# Patient Record
Sex: FEMALE | Race: WHITE | NOT HISPANIC OR LATINO | Employment: OTHER | URBAN - METROPOLITAN AREA
[De-identification: names, ages, dates, MRNs, and addresses within clinical notes are randomized per-mention and may not be internally consistent; named-entity substitution may affect disease eponyms.]

---

## 2017-09-07 ENCOUNTER — APPOINTMENT (OUTPATIENT)
Dept: WOMENS IMAGING | Facility: HOSPITAL | Age: 57
End: 2017-09-07

## 2017-09-07 PROCEDURE — 77067 SCR MAMMO BI INCL CAD: CPT | Performed by: RADIOLOGY

## 2017-09-07 PROCEDURE — 77063 BREAST TOMOSYNTHESIS BI: CPT | Performed by: RADIOLOGY

## 2017-09-18 ENCOUNTER — TREATMENT (OUTPATIENT)
Dept: PHYSICAL THERAPY | Facility: CLINIC | Age: 57
End: 2017-09-18

## 2017-09-18 DIAGNOSIS — M53.3 SACROILIAC JOINT DYSFUNCTION: ICD-10-CM

## 2017-09-18 DIAGNOSIS — S39.012D LUMBAR STRAIN, SUBSEQUENT ENCOUNTER: Primary | ICD-10-CM

## 2017-09-18 DIAGNOSIS — S86.112D GASTROCNEMIUS STRAIN, LEFT, SUBSEQUENT ENCOUNTER: ICD-10-CM

## 2017-09-18 DIAGNOSIS — S76.312D STRAIN OF HAMSTRING MUSCLE, LEFT, SUBSEQUENT ENCOUNTER: ICD-10-CM

## 2017-09-18 PROCEDURE — 97140 MANUAL THERAPY 1/> REGIONS: CPT | Performed by: PHYSICAL THERAPIST

## 2017-09-18 PROCEDURE — 97530 THERAPEUTIC ACTIVITIES: CPT | Performed by: PHYSICAL THERAPIST

## 2017-09-18 PROCEDURE — 97161 PT EVAL LOW COMPLEX 20 MIN: CPT | Performed by: PHYSICAL THERAPIST

## 2017-09-18 PROCEDURE — 97110 THERAPEUTIC EXERCISES: CPT | Performed by: PHYSICAL THERAPIST

## 2017-09-18 NOTE — PROGRESS NOTES
Orthopedic / Sports / Industrial Physical Therapy  Physical Therapy Initial Evaluation and Plan of Care    Patient Name: Azalia Castle          :  1960  Referring Physician: MUNIRA Spencer  Diagnosis: Lumbar strain, subsequent encounter [S39.012D]  ; SI Jt Dysfunction; Gastroc / HS Strain  Date of Evaluation: 2017  ______________________________________________________________________  Subjective Evaluation    History of Present Illness  Onset date: Couple of months ago.  Mechanism of injury: Exercising and noted pain later -     Pain  Current pain ratin  At worst pain ratin  Location: (L) LBP and pain posterior thigh, knee and calf - Denies NT-ing;   Alleviating factors: Heat; Stop activity; Alleve; Stretching knees out;   Exacerbated by: Bending knee;  Walking, rising, floor to stand, out of bed; (R) side-lying.  Symptom course: Somewhat better -     Diagnostic Tests  No diagnostic tests performed    Patient Goals  Patient/family treatment goals: Pain alleviation; normal mobility, strength, function and return to working out normally -          ___________________________________________________  Objective     Postural Observations  Standing posture: (L) Ilium / ASIS / PSIS higher vs (R); Hyperlordodic posture;         Palpation     Additional Palpation Details  Tender (L) LSS / SI / Sacral region - Tender at sacral base -   Tender posterior knee into proximal gastroc and distal hamstring -                 Active Range of Motion     Additional Active Range of Motion Details  Limited SB to (L) and limited lumbar flexion with pain (L) LSS / SI region upon return;   Knee / Hip WNL     Strength/Myotome Testing     Additional Strength Details  LE Myotomes grossly WNL -     Tests     Additional Tests Details  (-) SLR  (+) SI Jt Dysfunction (L) / upshear  (+) Flexed Sacrum   (-) Knee tests     See Treatment Flow sheet for Exercises, Manual therapy, and modalities.   FUNCTIONAL ACTIVITIES:  X 10 min  · TAPING / BRACING: NA  · Jt protection, ADL modification; Posture and     ___________________________________________________  Assessment & Plan     Assessment  Assessment details:   Lumbar Strain; SI Jt Dysfunciton; Hamstring / Gastroc strain LLE -     PROBLEMS: Pain; Limited mobility, strength, function, and unable to do regular job w/o limitations -   PROGNOSIS: Good    GOALS:   SHORT TERM GOALS: 2 weeks:  1) HEP Initiated; 2) Pain decreased 50%:   3) AROM  increased:  4) Improved functional ability grossly;     LONG TERM GOALS: 4 weeks (or at time of DISCHARGE): 1) (I) HEP; 2) AROM WFL and pain free; 3) Strength / mobility to be able to perform all ADL's and job-related activities w/o restrictions;       Plan  Planned therapy interventions: abdominal trunk stabilization, body mechanics training, flexibility, home exercise program, joint mobilization, therapeutic activities, stretching, strengthening, spinal/joint mobilization, soft tissue mobilization, postural training, neuromuscular re-education and manual therapy (Modalities prn; taping prn; )  Frequency: 2-3.  Duration in weeks: 4  Treatment plan discussed with: patient      ___________________________________________________  Manual Therapy:    25     mins  22026;   Therapeutic Exercise:    15     mins  63077;     Neuromuscular Chago:    NA    mins  38542;   Therapeutic Activity:     10     mins  29800;     Ultrasound:     10     mins  90906;    Electrical Stimulation:   20     mins  03861 ( );  Dry Needling     NA     mins self-pay   Gait Training:     NA     mins  85193;  EVAL TIME:   25 mins    Timed Treatment:   60   mins                Total Treatment:     110   mins    PT SIGNATURE:   Saurav Rothman, PT  DATE TREATMENT INITIATED: 9/18/2017  ___________________________________________________  Initial Certification  Certification Period: 12/17/2017  I certify that the therapy services are furnished while this patient is under  my care.  The services outlined above are required by this patient, and will be reviewed every 90 days.     PHYSICIAN: ________________________________  DATE: ______  MUNIRA Spencer        Please sign and return via fax to [phone].. Thank you, [facility].  ______________________________________________________________________  [address] [place] [address]  Phone: [phone] Fax: [phone]

## 2017-09-21 ENCOUNTER — TREATMENT (OUTPATIENT)
Dept: PHYSICAL THERAPY | Facility: CLINIC | Age: 57
End: 2017-09-21

## 2017-09-21 DIAGNOSIS — S76.312D STRAIN OF HAMSTRING MUSCLE, LEFT, SUBSEQUENT ENCOUNTER: ICD-10-CM

## 2017-09-21 DIAGNOSIS — S86.112D GASTROCNEMIUS STRAIN, LEFT, SUBSEQUENT ENCOUNTER: ICD-10-CM

## 2017-09-21 DIAGNOSIS — S39.012D LUMBAR STRAIN, SUBSEQUENT ENCOUNTER: Primary | ICD-10-CM

## 2017-09-21 DIAGNOSIS — M53.3 SACROILIAC JOINT DYSFUNCTION: ICD-10-CM

## 2017-09-21 PROCEDURE — 97014 ELECTRIC STIMULATION THERAPY: CPT | Performed by: PHYSICAL THERAPIST

## 2017-09-21 PROCEDURE — 97140 MANUAL THERAPY 1/> REGIONS: CPT | Performed by: PHYSICAL THERAPIST

## 2017-09-21 PROCEDURE — 97110 THERAPEUTIC EXERCISES: CPT | Performed by: PHYSICAL THERAPIST

## 2017-09-21 NOTE — PROGRESS NOTES
Physical Therapy Daily Progress Note    Patient Name: Azalia Castle         :  1960  Referring Physician: MUNIRA Spencer    Subjective   Azalia Castle reports: overall decreased pain and improved mobility and function - Notes pain in posterior knee after sitting for a while and minimal / no LBP since last session until today -   Notes pain in (L) LSS / SI region and posterior knee (L)     Objective   (L) Ilium / ASIS / PSIS higher vs (R);   Tender (L) LSS / SI region; Tender posterior (L) knee into proximal gastroc and distal HS (L)   (+) SI Jt dysfunction /  Upshear (L)-     See Exercise, Manual, and Modality Logs for complete treatment.     Functional / Therapeutic Activities:   min  · TAPING / BRACING: NA  · Jt protection, ADL modification; Posture and      Assessment/Plan  Lumbar strain / SI Jt Dysfunction; Gastroc / HS strain RLE   Improving with decreased pain and improved mobility and function -     Progress strengthening /stabilization /functional activity       _________________________________________________  Manual Therapy:    25     mins  04015;  Therapeutic Exercise:    20     mins  38258;     Neuromuscular Chago:    NA    mins  94532;    Therapeutic Activity:     NA     mins  94487;     Gait Training:      NA     mins  34333;     Ultrasound:     10     mins  41645;    Electrical Stimulation:    20     mins  15978 ( );  Dry Needling     NA     mins self-pay    Timed Treatment:   55   mins                  Total Treatment:     80   mins    Saurav Rothman PT  Physical Therapist

## 2017-09-25 ENCOUNTER — TREATMENT (OUTPATIENT)
Dept: PHYSICAL THERAPY | Facility: CLINIC | Age: 57
End: 2017-09-25

## 2017-09-25 DIAGNOSIS — S39.012D LUMBAR STRAIN, SUBSEQUENT ENCOUNTER: Primary | ICD-10-CM

## 2017-09-25 DIAGNOSIS — M53.3 SACROILIAC JOINT DYSFUNCTION: ICD-10-CM

## 2017-09-25 DIAGNOSIS — S76.312D STRAIN OF HAMSTRING MUSCLE, LEFT, SUBSEQUENT ENCOUNTER: ICD-10-CM

## 2017-09-25 DIAGNOSIS — S86.112D GASTROCNEMIUS STRAIN, LEFT, SUBSEQUENT ENCOUNTER: ICD-10-CM

## 2017-09-25 PROCEDURE — 97530 THERAPEUTIC ACTIVITIES: CPT | Performed by: PHYSICAL THERAPIST

## 2017-09-25 PROCEDURE — 97112 NEUROMUSCULAR REEDUCATION: CPT | Performed by: PHYSICAL THERAPIST

## 2017-09-25 PROCEDURE — 97110 THERAPEUTIC EXERCISES: CPT | Performed by: PHYSICAL THERAPIST

## 2017-09-28 ENCOUNTER — TREATMENT (OUTPATIENT)
Dept: PHYSICAL THERAPY | Facility: CLINIC | Age: 57
End: 2017-09-28

## 2017-09-28 DIAGNOSIS — M53.3 SACROILIAC JOINT DYSFUNCTION: ICD-10-CM

## 2017-09-28 DIAGNOSIS — S39.012D LUMBAR STRAIN, SUBSEQUENT ENCOUNTER: Primary | ICD-10-CM

## 2017-09-28 DIAGNOSIS — S76.312D STRAIN OF HAMSTRING MUSCLE, LEFT, SUBSEQUENT ENCOUNTER: ICD-10-CM

## 2017-09-28 DIAGNOSIS — S86.112D GASTROCNEMIUS STRAIN, LEFT, SUBSEQUENT ENCOUNTER: ICD-10-CM

## 2017-09-28 PROCEDURE — 97112 NEUROMUSCULAR REEDUCATION: CPT | Performed by: PHYSICAL THERAPIST

## 2017-09-28 PROCEDURE — 97110 THERAPEUTIC EXERCISES: CPT | Performed by: PHYSICAL THERAPIST

## 2017-09-28 PROCEDURE — 97530 THERAPEUTIC ACTIVITIES: CPT | Performed by: PHYSICAL THERAPIST

## 2017-10-01 NOTE — PROGRESS NOTES
Physical Therapy Daily Progress Note     Patient Name: Azalia Castle         :  1960  Referring Physician: MUNIRA Spencer     Subjective   Azalia Castle reports: decreased pain and improved mobility and function - Taping of (R) knee very helpful in alleviating anterior knee pain - Minimal LBP;  Less pain in posterior knee / distal thigh /proximal calf - Less painful getting up off of floor when doing exercises / boot camp - Feels like (L) hip is weak -  Bruising posterior knee from DTM last session, but pain much less -      Objective   Pelvis level -    Much less tender posterior (L) knee into proximal gastroc and distal HS (L)   Weak GM / Hip (L) with (+) Trendelenburg w/ single leg stance (L) -      See Exercise, Manual, and Modality Logs for complete treatment.      Functional / Therapeutic Activities: 25  min  · TAPING / BRACING: K-Tape to 1) Unload PF Jt (R); 2) Unload lateral HS (L);   · SEE EXERCISE FLOW SHEET -   · Jt protection, ADL modification; Posture and       Assessment/Plan  Lumbar strain / SI Jt Dysfunction; Gastroc / HS strain RLE   Improving with decreased pain and improved mobility and function -      Progress strengthening /stabilization /functional activity        _________________________________________________  Manual Therapy:                      NA     mins  74826;  Therapeutic Exercise:              40     mins  81810;     Neuromuscular Chago:             10    mins  22019;    Therapeutic Activity:                15     mins  41378;     Gait Training:                           NA     mins  74696;     Ultrasound:                              NA     mins  29452;    Electrical Stimulation:              NA     mins  42604 ( );  Dry Needling                            NA     mins self-pay     Timed Treatment:   65   mins                  Total Treatment:     75   mins     Saurav Rothman PT  Physical Therapist

## 2017-10-02 NOTE — PROGRESS NOTES
Physical Therapy Daily Progress Note      Patient Name: Azalia Castle         :  1960  Referring Physician: MUNIRA Spencer      Subjective   Azalia Castle reports: decreased pain and improved mobility and function - Taping of (R) knee very helpful in alleviating anterior knee pain - Minimal LBP;  Much Less pain in posterior knee / distal thigh /proximal calf - Know able to flex hip/knee, etc w/o pain - Much less painful getting up off of floor when doing exercises / boot camp -   Only notes some tightness in posterior knee / HS/calf -      Objective   Pelvis level -    Improved AROM all planes lumbar spine and LE -       See Exercise, Manual, and Modality Logs for complete treatment.      Functional / Therapeutic Activities: 25  min  · TAPING / BRACING: K-Tape to 1) Unload PF Jt (R);    · SEE EXERCISE FLOW SHEET -   · Jt protection, ADL modification; Posture and        Assessment/Plan  Lumbar strain / SI Jt Dysfunction; Gastroc / HS strain RLE   Improving with decreased pain and improved mobility and function -       Progress strengthening /stabilization /functional activity     _________________________________________________  Manual Therapy:                      NA     mins  45923;  Therapeutic Exercise:              45     mins  77176;     Neuromuscular Chago:             10    mins  47915;    Therapeutic Activity:                25     mins  20289;     Gait Training:                           NA     mins  37924;     Ultrasound:                              NA     mins  16010;    Electrical Stimulation:              NA     mins  81113 ( );  Dry Needling                            NA     mins self-pay      Timed Treatment:   80   mins                  Total Treatment:     85   mins      Saurav Rothman PT  Physical Therapist

## 2017-11-08 ENCOUNTER — TREATMENT (OUTPATIENT)
Dept: PHYSICAL THERAPY | Facility: CLINIC | Age: 57
End: 2017-11-08

## 2017-11-08 DIAGNOSIS — S86.911D KNEE STRAIN, RIGHT, SUBSEQUENT ENCOUNTER: ICD-10-CM

## 2017-11-08 DIAGNOSIS — S76.312D STRAIN OF HAMSTRING MUSCLE, LEFT, SUBSEQUENT ENCOUNTER: ICD-10-CM

## 2017-11-08 DIAGNOSIS — S86.112D GASTROCNEMIUS STRAIN, LEFT, SUBSEQUENT ENCOUNTER: Primary | ICD-10-CM

## 2017-11-08 PROCEDURE — 97164 PT RE-EVAL EST PLAN CARE: CPT | Performed by: PHYSICAL THERAPIST

## 2017-11-08 PROCEDURE — 97530 THERAPEUTIC ACTIVITIES: CPT | Performed by: PHYSICAL THERAPIST

## 2017-11-08 PROCEDURE — 97110 THERAPEUTIC EXERCISES: CPT | Performed by: PHYSICAL THERAPIST

## 2017-11-08 NOTE — PROGRESS NOTES
RE-EVAL / Physical Therapy Daily Progress Note      Patient Name: Azalia Castle         :  1960  Referring Physician: MUNIRA Spencer      Subjective   Azalia Castle reports: Taping of (L) knee very helpful in alleviating anterior knee pain - Minimal LBP;  Much Less pain in posterior knee / distal thigh /proximal calf - now only gets tight occasionally and is handled with taping -    Only notes some tightness in posterior knee / HS/calf -   Pain now mostly ant/inf/medial and medial (L) (onset after stepping up on very high step with  and fell off 10/26/17 and then at a party she bent over in high heels and her (L) knee HE 10/28/17 -  Standing, walking, squatting, stairs / LLE activities -  No problems with LB for some time -   Also notes pain (R) ant/medial proximal tibial region increased with RLE activities (driving, walking, steps, squatting, contact) -      Objective   Pelvis level -    Improved AROM all planes lumbar spine and LE -   (B) Calcaneal valgusGait WNL  (+) Step Up Test (L)  (+) Thessaly and Simba's medial jt line (L) knee -   Very tender medial joint line and pes region (L)  knee; mild tenderness medial PF jt (L) knee -   Compensated forefoot varus (B) feet -   Strength :  Limited VMO / Quad firing (L) with pain medial knee and PF Jt (L); Knee ext 4/5 w/ pain; Hip abduction4-/5 (L)       See Exercise, Manual, and Modality Logs for complete treatment.      Functional / Therapeutic Activities: 30  min  · TAPING / BRACING: K-Tape to 1) Unload PF Jt (L); 2) Unload infrapatellar joint; 3) Unload medial joint (L) knee;   Taping (B) Feet to address excessive pronation that increases stress to medial knee(s) -   · SEE EXERCISE FLOW SHEET -   · Jt protection, ADL modification; Posture and        Assessment/Plan  Lumbar strain / SI Jt Dysfunction - resolved; Gastroc / HS strain RLE near full resolution  (L) Knee strain with possible medial meniscus involvement and PF  Jt dysfunction -   Taping very helpful in decreasing knee pain and allowing improved function   Pt may need further assessment to rule out possible medial meniscus tear if no improvement with conservative Physical Therapy -    Problems: Pain, limited strength, gait, function and unable to participate in hobby activities such as working out, etc.   GOALS:   SHORT TERM GOALS: 2 weeks:  1) HEP Initiated; 2) Pain decreased 50%:   3) AROM  increased:  4) Improved gait / functional ability grossly;   LONG TERM GOALS: 4 weeks (or at time of DISCHARGE): 1) (I) HEP; 2) AROM WFL and pain free; 3) Strength / gait / mobility to be able to perform all ADL's and job-related activities w/o restrictions;       Progress strengthening /stabilization /functional activity 2-3 x / week including modalities prn, taping / bracing prn; Strengthening, NMR, functional activities, and HEP       _________________________________________________  Manual Therapy:                      NA     mins  92341;  Therapeutic Exercise:              30     mins  15167;     Neuromuscular Chago:             NA    mins  12770;    Therapeutic Activity:               30     mins  36052;     Gait Training:                           NA     mins  34547;     Ultrasound:                              NA     mins  06187;    Electrical Stimulation:              NA     mins  23094 ( );  Dry Needling                            NA     mins self-pay    RE-EVAL: 20 minutes    Timed Treatment:   60   mins                  Total Treatment:     90   mins      Saurav Rothman, PT  Physical Therapist

## 2018-11-08 ENCOUNTER — APPOINTMENT (OUTPATIENT)
Dept: WOMENS IMAGING | Facility: HOSPITAL | Age: 58
End: 2018-11-08

## 2018-11-08 PROCEDURE — 77063 BREAST TOMOSYNTHESIS BI: CPT | Performed by: RADIOLOGY

## 2018-11-08 PROCEDURE — 77067 SCR MAMMO BI INCL CAD: CPT | Performed by: RADIOLOGY

## 2020-03-06 ENCOUNTER — APPOINTMENT (OUTPATIENT)
Dept: WOMENS IMAGING | Facility: HOSPITAL | Age: 60
End: 2020-03-06

## 2020-03-06 PROCEDURE — 77067 SCR MAMMO BI INCL CAD: CPT | Performed by: RADIOLOGY

## 2020-03-06 PROCEDURE — 77063 BREAST TOMOSYNTHESIS BI: CPT | Performed by: RADIOLOGY

## 2021-02-25 ENCOUNTER — OFFICE VISIT (OUTPATIENT)
Dept: INTERNAL MEDICINE | Facility: CLINIC | Age: 61
End: 2021-02-25

## 2021-02-25 VITALS
TEMPERATURE: 97.1 F | HEIGHT: 60 IN | BODY MASS INDEX: 27.68 KG/M2 | DIASTOLIC BLOOD PRESSURE: 80 MMHG | HEART RATE: 81 BPM | RESPIRATION RATE: 18 BRPM | WEIGHT: 141 LBS | OXYGEN SATURATION: 98 % | SYSTOLIC BLOOD PRESSURE: 126 MMHG

## 2021-02-25 DIAGNOSIS — Z11.59 ENCOUNTER FOR HCV SCREENING TEST FOR LOW RISK PATIENT: ICD-10-CM

## 2021-02-25 DIAGNOSIS — F41.1 GAD (GENERALIZED ANXIETY DISORDER): ICD-10-CM

## 2021-02-25 DIAGNOSIS — Z00.00 WELL ADULT EXAM: Primary | ICD-10-CM

## 2021-02-25 DIAGNOSIS — Z11.4 SCREENING FOR HIV (HUMAN IMMUNODEFICIENCY VIRUS): ICD-10-CM

## 2021-02-25 DIAGNOSIS — F33.9 RECURRENT MAJOR DEPRESSIVE DISORDER, REMISSION STATUS UNSPECIFIED (HCC): ICD-10-CM

## 2021-02-25 PROCEDURE — 93000 ELECTROCARDIOGRAM COMPLETE: CPT | Performed by: INTERNAL MEDICINE

## 2021-02-25 PROCEDURE — 99396 PREV VISIT EST AGE 40-64: CPT | Performed by: INTERNAL MEDICINE

## 2021-02-25 RX ORDER — RALOXIFENE HYDROCHLORIDE 60 MG/1
60 TABLET, FILM COATED ORAL DAILY
COMMUNITY
Start: 2021-02-07

## 2021-02-25 RX ORDER — DESVENLAFAXINE 100 MG/1
100 TABLET, EXTENDED RELEASE ORAL DAILY
COMMUNITY
End: 2021-03-15 | Stop reason: SDUPTHER

## 2021-02-25 NOTE — PROGRESS NOTES
"Chief Complaint   Patient presents with   • Annual Exam       Subjective   Azalia Castle is a 60 y.o. female.     Having upcoming surgery, re doing her breast augmentation in mid 3/2021; no issues with chest pain, sob, headaches, vision changes    Taking raloxifine for osteoporosis, doing well, no side effects; taking for last 8 months; following with GYN    HOME/MDD, taking desvenlafaxine, doing well without issues; mood is doing well       The following portions of the patient's history were reviewed and updated as appropriate: allergies, current medications, past family history, past medical history, past social history, past surgical history, and problem list.    Review of Systems      Objective   Body mass index is 27.54 kg/m².   Vitals:    02/25/21 0911   BP: 126/80   BP Location: Left arm   Patient Position: Sitting   Cuff Size: Adult   Pulse: 81   Resp: 18   Temp: 97.1 °F (36.2 °C)   SpO2: 98%   Weight: 64 kg (141 lb)   Height: 152.4 cm (60\")        Physical Exam  Vitals signs and nursing note reviewed.   Constitutional:       Appearance: Normal appearance.   HENT:      Head: Normocephalic and atraumatic.      Right Ear: Tympanic membrane, ear canal and external ear normal.      Left Ear: Tympanic membrane, ear canal and external ear normal.      Nose: Nose normal.      Mouth/Throat:      Mouth: Mucous membranes are moist.      Pharynx: Oropharynx is clear.   Eyes:      Extraocular Movements: Extraocular movements intact.      Conjunctiva/sclera: Conjunctivae normal.      Pupils: Pupils are equal, round, and reactive to light.   Neck:      Musculoskeletal: Normal range of motion and neck supple.   Cardiovascular:      Rate and Rhythm: Normal rate and regular rhythm.      Pulses: Normal pulses.      Heart sounds: Normal heart sounds.   Pulmonary:      Effort: Pulmonary effort is normal.      Breath sounds: Normal breath sounds. No wheezing, rhonchi or rales.   Abdominal:      General: Abdomen is flat. " There is no distension.      Palpations: Abdomen is soft.      Tenderness: There is no abdominal tenderness.   Musculoskeletal: Normal range of motion.      Right lower leg: No edema.      Left lower leg: No edema.   Skin:     General: Skin is warm and dry.      Capillary Refill: Capillary refill takes less than 2 seconds.      Findings: No rash.   Neurological:      General: No focal deficit present.      Mental Status: She is alert and oriented to person, place, and time. Mental status is at baseline.   Psychiatric:         Mood and Affect: Mood normal.         Behavior: Behavior normal.           Current Outpatient Medications:   •  Cholecalciferol 50 MCG (2000 UT) tablet, Take  by mouth., Disp: , Rfl:   •  desvenlafaxine (PRISTIQ) 100 MG 24 hr tablet, Take 100 mg by mouth Daily., Disp: , Rfl:   •  raloxifene (EVISTA) 60 MG tablet, Take 60 mg by mouth Daily., Disp: , Rfl:      No results found for: CBCDIF, CMP, LIPIDINTERP, HGBA1C, TSH, EIHX85YF, PSA, TESTOSTERONE     Health Maintenance   Topic Date Due   • COLONOSCOPY  1960   • ZOSTER VACCINE (1 of 2) 11/01/2010   • MAMMOGRAM  09/18/2017   • PAP SMEAR  09/18/2017   • INFLUENZA VACCINE  08/01/2020   • LIPID PANEL  02/25/2021   • TDAP/TD VACCINES (2 - Td) 03/16/2029        Immunization History   Administered Date(s) Administered   • Tdap 03/16/2019       ECG 12 Lead    Date/Time: 2/25/2021 11:20 AM  Performed by: Yoseph Barros MD  Authorized by: Yoseph Barros MD   Previous ECG: no previous ECG available  Rhythm: sinus rhythm  Rate: normal  Conduction: conduction normal  ST Segments: ST segments normal  T Waves: T waves normal  QRS axis: normal  Other: no other findings    Clinical impression: normal ECG              Assessment/Plan   Diagnoses and all orders for this visit:    1. Well adult exam (Primary)  -     CBC & Differential  -     Comprehensive Metabolic Panel  -     TSH  -     Lipid Panel With / Chol / HDL Ratio  -     Vitamin D 25  Hydroxy  -     Hemoglobin A1c  -     ECG 12 Lead    PAP - normal in 2019 she states, getting repeat in 3/2020, get records  Mammogram - normal in 2019 she states, getting repeat in 3/2020, get records  Colonoscopy - she believes 2012, has polyps, will get records for review; if polyps supsect may need at 5 years  Glaucoma - yearly  AAA - na  Lung cancer - na  DXA - osteoporisis, following with GYN, raloxifine  HIV - checkign  HCV - checking  DM - checking  HLD - checking  Smoking - no  Depression - yes, well managed with meds  Vaccines - tdap 2019, get records  Falls - no issues  Alcohol Screening - no issues    2. Encounter for HCV screening test for low risk patient  -     Hepatitis C antibody    3. Screening for HIV (human immunodeficiency virus)  -     HIV-1/O/2 Ag/Ab w Reflex    4. HOME (generalized anxiety disorder)  5. Recurrent major depressive disorder, remission status unspecified (CMS/HCC)  - conitnue desvenlafaxine, doing well, discussed risks and benefits, outside time, exercise, therapy  - rtc 6 months                 No follow-ups on file.     Yoseph Barros MD  [facility]  Internal Medicine and Pediatrics  Phone: [phone]  Fax: [phone]

## 2021-02-26 ENCOUNTER — TELEPHONE (OUTPATIENT)
Dept: INTERNAL MEDICINE | Facility: CLINIC | Age: 61
End: 2021-02-26

## 2021-02-26 LAB
25(OH)D3+25(OH)D2 SERPL-MCNC: 65.4 NG/ML (ref 30–100)
ALBUMIN SERPL-MCNC: 4.6 G/DL (ref 3.5–5.2)
ALBUMIN/GLOB SERPL: 2.4 G/DL
ALP SERPL-CCNC: 101 U/L (ref 39–117)
ALT SERPL-CCNC: 14 U/L (ref 1–33)
AST SERPL-CCNC: 14 U/L (ref 1–32)
BASOPHILS # BLD AUTO: 0.07 10*3/MM3 (ref 0–0.2)
BASOPHILS NFR BLD AUTO: 1.1 % (ref 0–1.5)
BILIRUB SERPL-MCNC: 0.3 MG/DL (ref 0–1.2)
BUN SERPL-MCNC: 18 MG/DL (ref 8–23)
BUN/CREAT SERPL: 26.5 (ref 7–25)
CALCIUM SERPL-MCNC: 10 MG/DL (ref 8.6–10.5)
CHLORIDE SERPL-SCNC: 105 MMOL/L (ref 98–107)
CHOLEST SERPL-MCNC: 238 MG/DL (ref 0–200)
CHOLEST/HDLC SERPL: 3.22 {RATIO}
CO2 SERPL-SCNC: 24.7 MMOL/L (ref 22–29)
CREAT SERPL-MCNC: 0.68 MG/DL (ref 0.57–1)
EOSINOPHIL # BLD AUTO: 0.08 10*3/MM3 (ref 0–0.4)
EOSINOPHIL NFR BLD AUTO: 1.2 % (ref 0.3–6.2)
ERYTHROCYTE [DISTWIDTH] IN BLOOD BY AUTOMATED COUNT: 12.2 % (ref 12.3–15.4)
GLOBULIN SER CALC-MCNC: 1.9 GM/DL
GLUCOSE SERPL-MCNC: 97 MG/DL (ref 65–99)
HBA1C MFR BLD: 5.4 % (ref 4.8–5.6)
HCT VFR BLD AUTO: 42.3 % (ref 34–46.6)
HCV AB S/CO SERPL IA: <0.1 S/CO RATIO (ref 0–0.9)
HDLC SERPL-MCNC: 74 MG/DL (ref 40–60)
HGB BLD-MCNC: 13.7 G/DL (ref 12–15.9)
HIV 1+2 AB+HIV1 P24 AG SERPL QL IA: NON REACTIVE
IMM GRANULOCYTES # BLD AUTO: 0.02 10*3/MM3 (ref 0–0.05)
IMM GRANULOCYTES NFR BLD AUTO: 0.3 % (ref 0–0.5)
LDLC SERPL CALC-MCNC: 153 MG/DL (ref 0–100)
LYMPHOCYTES # BLD AUTO: 1.92 10*3/MM3 (ref 0.7–3.1)
LYMPHOCYTES NFR BLD AUTO: 29.6 % (ref 19.6–45.3)
MCH RBC QN AUTO: 30.2 PG (ref 26.6–33)
MCHC RBC AUTO-ENTMCNC: 32.4 G/DL (ref 31.5–35.7)
MCV RBC AUTO: 93.2 FL (ref 79–97)
MONOCYTES # BLD AUTO: 0.54 10*3/MM3 (ref 0.1–0.9)
MONOCYTES NFR BLD AUTO: 8.3 % (ref 5–12)
NEUTROPHILS # BLD AUTO: 3.86 10*3/MM3 (ref 1.7–7)
NEUTROPHILS NFR BLD AUTO: 59.5 % (ref 42.7–76)
NRBC BLD AUTO-RTO: 0 /100 WBC (ref 0–0.2)
PLATELET # BLD AUTO: 256 10*3/MM3 (ref 140–450)
POTASSIUM SERPL-SCNC: 4.3 MMOL/L (ref 3.5–5.2)
PROT SERPL-MCNC: 6.5 G/DL (ref 6–8.5)
RBC # BLD AUTO: 4.54 10*6/MM3 (ref 3.77–5.28)
SODIUM SERPL-SCNC: 139 MMOL/L (ref 136–145)
TRIGL SERPL-MCNC: 66 MG/DL (ref 0–150)
TSH SERPL DL<=0.005 MIU/L-ACNC: 3.71 UIU/ML (ref 0.27–4.2)
VLDLC SERPL CALC-MCNC: 11 MG/DL (ref 5–40)
WBC # BLD AUTO: 6.49 10*3/MM3 (ref 3.4–10.8)

## 2021-02-26 NOTE — TELEPHONE ENCOUNTER
PATIENT NEEDS FOR DR WARD TO WRITE SOMETHING STATING THAT SHE IS CLEARED TO TO HAVE SURGERY, SURGERY DATE IS 03/16. SHE WILL NEED TO HAVE THIS BY 03/03. SHE NEEDS  TO TAKE THIS TO THE SURGEON WHEN SHE GOES FOR HER PRE OP    PLEASE ADVISE WHEN SHE CAN PICK THIS UP    [phone]

## 2021-03-03 RX ORDER — DESVENLAFAXINE 100 MG/1
TABLET, EXTENDED RELEASE ORAL
Qty: 90 TABLET | OUTPATIENT
Start: 2021-03-03

## 2021-03-08 ENCOUNTER — APPOINTMENT (OUTPATIENT)
Dept: WOMENS IMAGING | Facility: HOSPITAL | Age: 61
End: 2021-03-08

## 2021-03-08 PROCEDURE — 77063 BREAST TOMOSYNTHESIS BI: CPT | Performed by: RADIOLOGY

## 2021-03-08 PROCEDURE — 77067 SCR MAMMO BI INCL CAD: CPT | Performed by: RADIOLOGY

## 2021-03-15 RX ORDER — DESVENLAFAXINE 100 MG/1
100 TABLET, EXTENDED RELEASE ORAL DAILY
Qty: 90 TABLET | Refills: 2 | Status: SHIPPED | OUTPATIENT
Start: 2021-03-15 | End: 2021-12-12

## 2021-03-15 NOTE — TELEPHONE ENCOUNTER
Caller: Azalia Castle    Relationship: Self    Best call back number: [phone] (M)  Medication needed:   Requested Prescriptions     Pending Prescriptions Disp Refills   • desvenlafaxine (PRISTIQ) 100 MG 24 hr tablet       Sig: Take 1 tablet by mouth Daily.       When do you need the refill by: 03/15/21    What details did the patient provide when requesting the medication: PATIENT HAS 5 LEFT    Does the patient have less than a 3 day supply:  [] Yes  [x] No    What is the patient's preferred pharmacy: [id number] [address] [phone] [facility] [phone]

## 2021-03-17 ENCOUNTER — APPOINTMENT (OUTPATIENT)
Dept: WOMENS IMAGING | Facility: HOSPITAL | Age: 61
End: 2021-03-17

## 2021-03-17 PROCEDURE — 77061 BREAST TOMOSYNTHESIS UNI: CPT | Performed by: RADIOLOGY

## 2021-03-17 PROCEDURE — 77065 DX MAMMO INCL CAD UNI: CPT | Performed by: RADIOLOGY

## 2021-03-17 PROCEDURE — G0279 TOMOSYNTHESIS, MAMMO: HCPCS | Performed by: RADIOLOGY

## 2021-03-17 PROCEDURE — 76641 ULTRASOUND BREAST COMPLETE: CPT | Performed by: RADIOLOGY

## 2021-09-29 ENCOUNTER — TELEPHONE (OUTPATIENT)
Dept: INTERNAL MEDICINE | Facility: CLINIC | Age: 61
End: 2021-09-29

## 2021-09-29 NOTE — TELEPHONE ENCOUNTER
Caller: Azalia Castle    Relationship: Self    Best call back number: [phone]    What form or medical record are you requesting: LABS FORM 02/25    Who is requesting this form or medical record from you: ANOTHER DR     How would you like to receive the form or medical records (pick-up, mail, fax): PCIKUP    If pick-up, provide patient with address and location details    Timeframe paperwork needed: ASAP    Additional notes: PATIENT IS WANTING TO KNOW IF SHE CAN  LABS FROM 02/25. SHE NEEDS TO  TODAY. PLEASE CALLBACK

## 2021-12-12 RX ORDER — DESVENLAFAXINE 100 MG/1
TABLET, EXTENDED RELEASE ORAL
Qty: 30 TABLET | Refills: 0 | Status: SHIPPED | OUTPATIENT
Start: 2021-12-12 | End: 2022-01-21 | Stop reason: SDUPTHER

## 2022-02-08 ENCOUNTER — TELEPHONE (OUTPATIENT)
Dept: INTERNAL MEDICINE | Facility: CLINIC | Age: 62
End: 2022-02-08

## 2022-02-08 NOTE — TELEPHONE ENCOUNTER
Caller: Azalia Castle    Relationship: Self    Best call back number: [phone]    What medication are you requesting: SOMETHING FOR LINGERING COUGH     What are your current symptoms: COUGH AND CONGESTION    How long have you been experiencing symptoms: 9 DAYS    Have you had these symptoms before:    [] Yes  [x] No    Have you been treated for these symptoms before:   [] Yes  [x] No    If a prescription is needed, what is your preferred pharmacy and phone number:  [id number] - [facility] - [address]  AT [place] [address] & [facility] [facility] - [phone]  - [phone] [employer]  [phone]    Additional notes: PATIENT TESTED POSITIVE FOR COVID ON 01/30/22. SHE IS HAVING LINGERING COUGH AND CONGESTION. SHE IS COUGHING UP GUNK.     PATIENT IS ASKING FOR SOMETHING TO HELP WITH THE COUGH. SHE IS NOT SLEEPING. SHE STATES THAT WHEN SHE LAYS DOWN THE COUGHING GETS WORSE.     PLEASE CALL PATIENT TO ADVISE IF SHE NEEDS TO BE SEEN IN OFFICE/MYCHART OR IF OFFICE WAS ABLE TO CALL IN A PRESCRIPTION.

## 2022-02-09 NOTE — TELEPHONE ENCOUNTER
Needs appt, can go to urgent care if needs appt today, otherwise we may have appts later this week

## 2022-02-14 RX ORDER — DESVENLAFAXINE 100 MG/1
100 TABLET, EXTENDED RELEASE ORAL DAILY
Qty: 30 TABLET | Refills: 0 | Status: SHIPPED | OUTPATIENT
Start: 2022-02-14 | End: 2022-03-14

## 2022-02-16 ENCOUNTER — OFFICE VISIT (OUTPATIENT)
Dept: INTERNAL MEDICINE | Facility: CLINIC | Age: 62
End: 2022-02-16

## 2022-02-16 VITALS
RESPIRATION RATE: 18 BRPM | HEART RATE: 74 BPM | OXYGEN SATURATION: 98 % | HEIGHT: 60 IN | BODY MASS INDEX: 27.61 KG/M2 | WEIGHT: 140.6 LBS | TEMPERATURE: 98.4 F | SYSTOLIC BLOOD PRESSURE: 128 MMHG | DIASTOLIC BLOOD PRESSURE: 72 MMHG

## 2022-02-16 DIAGNOSIS — J18.9 COMMUNITY ACQUIRED PNEUMONIA, UNSPECIFIED LATERALITY: Primary | ICD-10-CM

## 2022-02-16 DIAGNOSIS — H61.21 IMPACTED CERUMEN OF RIGHT EAR: ICD-10-CM

## 2022-02-16 PROCEDURE — 99213 OFFICE O/P EST LOW 20 MIN: CPT | Performed by: INTERNAL MEDICINE

## 2022-02-16 PROCEDURE — 69209 REMOVE IMPACTED EAR WAX UNI: CPT | Performed by: INTERNAL MEDICINE

## 2022-02-16 RX ORDER — ASPIRIN 81 MG/1
81 TABLET ORAL DAILY
COMMUNITY

## 2022-02-16 RX ORDER — BENZONATATE 200 MG/1
CAPSULE ORAL
COMMUNITY
Start: 2022-02-04 | End: 2022-10-18

## 2022-02-16 RX ORDER — AZELASTINE HYDROCHLORIDE 0.5 MG/ML
SOLUTION/ DROPS OPHTHALMIC
COMMUNITY
Start: 2021-12-22

## 2022-02-16 RX ORDER — LEVOFLOXACIN 750 MG/1
750 TABLET ORAL DAILY
Qty: 5 TABLET | Refills: 0 | Status: SHIPPED | OUTPATIENT
Start: 2022-02-16 | End: 2022-02-21

## 2022-02-16 NOTE — PROGRESS NOTES
"Chief Complaint  covid (x 3 weeks ago ), Bronchitis (x 3 weeks ), and Cough (productive )    Subjective          Azalia Castle presents to [facility]  Here with 3 weeks of cough, congestion; has been seen by urgent care, allergy; has completed azithro, prednisone burst, albuterol; feels she is now coughing up yelow phlegm; is having some congestion and head pressure, ear pressure    Bronchitis  Associated symptoms include coughing.   Cough  Her past medical history is significant for bronchitis.       Objective   Vital Signs:   /72   Pulse 74   Temp 98.4 °F (36.9 °C)   Resp 18   Ht 152.4 cm (60\")   Wt 63.8 kg (140 lb 9.6 oz)   SpO2 98%   BMI 27.46 kg/m²     Physical Exam   Result Review :          Ear Cerumen Removal    Date/Time: 2/16/2022 11:17 AM  Performed by: Yoseph Barros MD  Authorized by: Yoseph Barros MD     Anesthesia:  Local Anesthetic: none  Location details: right ear  Patient tolerance: patient tolerated the procedure well with no immediate complications  Procedure type: irrigation   Sedation:  Patient sedated: no              Assessment and Plan    Diagnoses and all orders for this visit:    1. Community acquired pneumonia, unspecified laterality (Primary)  -     levoFLOXacin (Levaquin) 750 MG tablet; Take 1 tablet by mouth Daily for 5 days.  Dispense: 5 tablet; Refill: 0  -     XR Chest PA & Lateral; Future    2. Impacted cerumen of right ear  -     Cerumen Removal    - consider CAP vs. Sinusitis; will start levofloxacin, discussed risks and benefits; conitnue flonase and zyrtec  - rtc to follow up worsening, change in illness  - ears irrigated and cleaned today      Follow Up   No follow-ups on file.  Patient was given instructions and counseling regarding her condition or for health maintenance advice. Please see specific information pulled into the AVS if appropriate.       "

## 2022-03-14 RX ORDER — DESVENLAFAXINE 100 MG/1
TABLET, EXTENDED RELEASE ORAL
Qty: 30 TABLET | Refills: 0 | Status: SHIPPED | OUTPATIENT
Start: 2022-03-14 | End: 2022-04-13

## 2022-03-14 NOTE — TELEPHONE ENCOUNTER
Rx Refill Note  Requested Prescriptions     Pending Prescriptions Disp Refills   • desvenlafaxine (PRISTIQ) 100 MG 24 hr tablet [Pharmacy Med Name: DESVENLAFAXINE SUCCNT ER 100MG] 30 tablet 0     Sig: TAKE 1 TABLET BY MOUTH EVERY DAY      Last office visit with prescribing clinician: 2/16/2022      Next office visit with prescribing clinician: 4/13/2022            Andres Renee MA  03/14/22, 08:15 EDT

## 2022-04-13 ENCOUNTER — OFFICE VISIT (OUTPATIENT)
Dept: INTERNAL MEDICINE | Facility: CLINIC | Age: 62
End: 2022-04-13

## 2022-04-13 VITALS
HEART RATE: 64 BPM | DIASTOLIC BLOOD PRESSURE: 64 MMHG | WEIGHT: 140 LBS | TEMPERATURE: 98.2 F | OXYGEN SATURATION: 98 % | SYSTOLIC BLOOD PRESSURE: 118 MMHG | BODY MASS INDEX: 27.48 KG/M2 | RESPIRATION RATE: 18 BRPM | HEIGHT: 60 IN

## 2022-04-13 DIAGNOSIS — Z12.11 ENCOUNTER FOR SCREENING FOR MALIGNANT NEOPLASM OF COLON: ICD-10-CM

## 2022-04-13 DIAGNOSIS — Z00.00 WELL ADULT EXAM: Primary | ICD-10-CM

## 2022-04-13 DIAGNOSIS — E55.9 VITAMIN D DEFICIENCY: ICD-10-CM

## 2022-04-13 DIAGNOSIS — Z12.31 ENCOUNTER FOR SCREENING MAMMOGRAM FOR MALIGNANT NEOPLASM OF BREAST: ICD-10-CM

## 2022-04-13 PROCEDURE — 93000 ELECTROCARDIOGRAM COMPLETE: CPT | Performed by: INTERNAL MEDICINE

## 2022-04-13 PROCEDURE — 99396 PREV VISIT EST AGE 40-64: CPT | Performed by: INTERNAL MEDICINE

## 2022-04-13 RX ORDER — BUPROPION HYDROCHLORIDE 150 MG/1
150 TABLET ORAL DAILY
Qty: 30 TABLET | Refills: 3 | Status: SHIPPED | OUTPATIENT
Start: 2022-04-13

## 2022-04-13 RX ORDER — DESVENLAFAXINE 100 MG/1
TABLET, EXTENDED RELEASE ORAL
Qty: 30 TABLET | Refills: 0 | Status: SHIPPED | OUTPATIENT
Start: 2022-04-13 | End: 2022-04-28 | Stop reason: SDUPTHER

## 2022-04-13 NOTE — PROGRESS NOTES
"Chief Complaint   Patient presents with   • Annual Exam       Subjective   Azalia Castle is a 61 y.o. female.     History of Present Illness     The following portions of the patient's history were reviewed and updated as appropriate: allergies, current medications, past family history, past medical history, past social history, past surgical history, and problem list.    Review of Systems      Objective   Body mass index is 27.34 kg/m².   Vitals:    04/13/22 1054   BP: 118/64   Pulse: 64   Resp: 18   Temp: 98.2 °F (36.8 °C)   SpO2: 98%   Weight: 63.5 kg (140 lb)   Height: 152.4 cm (60\")        Physical Exam  Vitals and nursing note reviewed.   Constitutional:       Appearance: Normal appearance.   HENT:      Head: Normocephalic and atraumatic.      Right Ear: Tympanic membrane, ear canal and external ear normal.      Left Ear: Tympanic membrane, ear canal and external ear normal.      Nose: Nose normal.      Mouth/Throat:      Mouth: Mucous membranes are moist.      Pharynx: Oropharynx is clear.   Eyes:      Extraocular Movements: Extraocular movements intact.      Conjunctiva/sclera: Conjunctivae normal.      Pupils: Pupils are equal, round, and reactive to light.   Cardiovascular:      Rate and Rhythm: Normal rate and regular rhythm.      Pulses: Normal pulses.      Heart sounds: Normal heart sounds.   Pulmonary:      Effort: Pulmonary effort is normal.      Breath sounds: Normal breath sounds. No wheezing, rhonchi or rales.   Abdominal:      General: Abdomen is flat. There is no distension.      Palpations: Abdomen is soft.      Tenderness: There is no abdominal tenderness.   Musculoskeletal:         General: Normal range of motion.      Cervical back: Normal range of motion and neck supple.      Right lower leg: No edema.      Left lower leg: No edema.   Skin:     General: Skin is warm and dry.      Capillary Refill: Capillary refill takes less than 2 seconds.      Findings: No rash.   Neurological:      " General: No focal deficit present.      Mental Status: She is alert and oriented to person, place, and time. Mental status is at baseline.   Psychiatric:         Mood and Affect: Mood normal.         Behavior: Behavior normal.           Current Outpatient Medications:   •  aspirin 81 MG EC tablet, Take 81 mg by mouth Daily., Disp: , Rfl:   •  azelastine (OPTIVAR) 0.05 % ophthalmic solution, INSTILL 1 DROP IN EACH EYE TWICE DAILY AS NEEDED, Disp: , Rfl:   •  benzonatate (TESSALON) 200 MG capsule, TAKE 1 CAPSULE BY MOUTH EVERY 12 HOURS AS NEEDED, Disp: , Rfl:   •  Cholecalciferol 50 MCG (2000 UT) tablet, Take  by mouth., Disp: , Rfl:   •  desvenlafaxine (PRISTIQ) 100 MG 24 hr tablet, TAKE 1 TABLET BY MOUTH EVERY DAY, Disp: 30 tablet, Rfl: 0  •  raloxifene (EVISTA) 60 MG tablet, Take 60 mg by mouth Daily., Disp: , Rfl:      Lab Results   Component Value Date    HGBA1C 5.40 02/25/2021    TSH 3.710 02/25/2021    [id number] 65.4 02/25/2021        Health Maintenance   Topic Date Due   • ZOSTER VACCINE (1 of 2) Never done   • MAMMOGRAM  Never done   • PAP SMEAR  Never done   • LIPID PANEL  02/25/2022   • INFLUENZA VACCINE  08/01/2022   • TDAP/TD VACCINES (2 - Td or Tdap) 03/16/2029        Immunization History   Administered Date(s) Administered   • COVID-19 (PFIZER) PURPLE CAP 03/27/2021, 04/17/2021   • Tdap 03/16/2019         Assessment/Plan   Diagnoses and all orders for this visit:    1. Well adult exam (Primary)    2. Vitamin D deficiency    3. Encounter for screening mammogram for malignant neoplasm of breast             Well adult exam  - labs checked and evaluated    PAP - with GYN, up to date  Mammogram - ordered today  Colonoscopy - last 10 years ago, due now, states history of polyps  Glaucoma - yearly  AAA - na  Lung cancer - na  DXA - at 65  HIV - checking  HCV - checking  DM - checking  HLD - checking  Smoking - no  Depression - no, [id number]  Vaccines - get records  Falls - no  Alcohol Screening - no    Discussed  mental health, sexual health, substance use, abuse, anticipatory guidance given.      No follow-ups on file.     Yoseph Barros MD  [facility] [place]  Internal Medicine and Pediatrics  Phone: [phone]  Fax: [phone]

## 2022-04-14 LAB
25(OH)D3+25(OH)D2 SERPL-MCNC: 61 NG/ML (ref 30–100)
ALBUMIN SERPL-MCNC: 4.3 G/DL (ref 3.8–4.8)
ALBUMIN/GLOB SERPL: 2.2 {RATIO} (ref 1.2–2.2)
ALP SERPL-CCNC: 84 IU/L (ref 44–121)
ALT SERPL-CCNC: 16 IU/L (ref 0–32)
AST SERPL-CCNC: 19 IU/L (ref 0–40)
BASOPHILS # BLD AUTO: 0.1 X10E3/UL (ref 0–0.2)
BASOPHILS NFR BLD AUTO: 1 %
BILIRUB SERPL-MCNC: 0.3 MG/DL (ref 0–1.2)
BUN SERPL-MCNC: 14 MG/DL (ref 8–27)
BUN/CREAT SERPL: 17 (ref 12–28)
CALCIUM SERPL-MCNC: 9.3 MG/DL (ref 8.7–10.3)
CHLORIDE SERPL-SCNC: 105 MMOL/L (ref 96–106)
CHOLEST SERPL-MCNC: 234 MG/DL (ref 100–199)
CHOLEST/HDLC SERPL: 3 RATIO (ref 0–4.4)
CO2 SERPL-SCNC: 20 MMOL/L (ref 20–29)
CREAT SERPL-MCNC: 0.81 MG/DL (ref 0.57–1)
EGFRCR SERPLBLD CKD-EPI 2021: 83 ML/MIN/1.73
EOSINOPHIL # BLD AUTO: 0.2 X10E3/UL (ref 0–0.4)
EOSINOPHIL NFR BLD AUTO: 3 %
ERYTHROCYTE [DISTWIDTH] IN BLOOD BY AUTOMATED COUNT: 12.6 % (ref 11.7–15.4)
GLOBULIN SER CALC-MCNC: 2 G/DL (ref 1.5–4.5)
GLUCOSE SERPL-MCNC: 93 MG/DL (ref 65–99)
HBA1C MFR BLD: 5.4 % (ref 4.8–5.6)
HCT VFR BLD AUTO: 40.3 % (ref 34–46.6)
HDLC SERPL-MCNC: 78 MG/DL
HGB BLD-MCNC: 13.3 G/DL (ref 11.1–15.9)
IMM GRANULOCYTES # BLD AUTO: 0 X10E3/UL (ref 0–0.1)
IMM GRANULOCYTES NFR BLD AUTO: 0 %
INSULIN SERPL-ACNC: 9.7 UIU/ML (ref 2.6–24.9)
LDLC SERPL CALC-MCNC: 141 MG/DL (ref 0–99)
LYMPHOCYTES # BLD AUTO: 1.9 X10E3/UL (ref 0.7–3.1)
LYMPHOCYTES NFR BLD AUTO: 33 %
MCH RBC QN AUTO: 31.7 PG (ref 26.6–33)
MCHC RBC AUTO-ENTMCNC: 33 G/DL (ref 31.5–35.7)
MCV RBC AUTO: 96 FL (ref 79–97)
MONOCYTES # BLD AUTO: 0.5 X10E3/UL (ref 0.1–0.9)
MONOCYTES NFR BLD AUTO: 9 %
NEUTROPHILS # BLD AUTO: 3.2 X10E3/UL (ref 1.4–7)
NEUTROPHILS NFR BLD AUTO: 54 %
PLATELET # BLD AUTO: 282 X10E3/UL (ref 150–450)
POTASSIUM SERPL-SCNC: 4.6 MMOL/L (ref 3.5–5.2)
PROT SERPL-MCNC: 6.3 G/DL (ref 6–8.5)
RBC # BLD AUTO: 4.2 X10E6/UL (ref 3.77–5.28)
SODIUM SERPL-SCNC: 142 MMOL/L (ref 134–144)
TRIGL SERPL-MCNC: 88 MG/DL (ref 0–149)
TSH SERPL DL<=0.005 MIU/L-ACNC: 2 UIU/ML (ref 0.45–4.5)
VLDLC SERPL CALC-MCNC: 15 MG/DL (ref 5–40)
WBC # BLD AUTO: 5.9 X10E3/UL (ref 3.4–10.8)

## 2022-04-27 ENCOUNTER — TELEPHONE (OUTPATIENT)
Dept: INTERNAL MEDICINE | Facility: CLINIC | Age: 62
End: 2022-04-27

## 2022-04-27 RX ORDER — DESVENLAFAXINE 100 MG/1
100 TABLET, EXTENDED RELEASE ORAL DAILY
Qty: 30 TABLET | Refills: 0 | Status: CANCELLED | OUTPATIENT
Start: 2022-04-27

## 2022-04-27 NOTE — TELEPHONE ENCOUNTER
Rx Refill Note  Requested Prescriptions     Pending Prescriptions Disp Refills   • desvenlafaxine (PRISTIQ) 100 MG 24 hr tablet 30 tablet 0     Sig: Take 1 tablet by mouth Daily.      Last office visit with prescribing clinician: 4/13/2022      Next office visit with prescribing clinician: Visit date not found            Andres Renee MA  04/27/22, 09:24 EDT

## 2022-04-27 NOTE — TELEPHONE ENCOUNTER
Caller: Azalia Castle    Relationship: Self    Best call back number:     Requested Prescriptions:   Requested Prescriptions     Pending Prescriptions Disp Refills   • desvenlafaxine (PRISTIQ) 100 MG 24 hr tablet 30 tablet 0     Sig: Take 1 tablet by mouth Daily.        Pharmacy where request should be sent: [facility]/PHARMACY #[id number] [place], [place] - [address] [facility] [phone] [facility] [phone]      Additional details provided by patient: FOR INSURANCE NEEDS 90 DAY SUPPLY    Does the patient have less than a 3 day supply:  [] Yes  [x] No    Lianet Moreland Rep   04/27/22 09:13 EDT

## 2022-04-28 RX ORDER — DESVENLAFAXINE 100 MG/1
100 TABLET, EXTENDED RELEASE ORAL DAILY
Qty: 30 TABLET | Refills: 3 | Status: SHIPPED | OUTPATIENT
Start: 2022-04-28 | End: 2022-07-25

## 2022-04-28 RX ORDER — DESVENLAFAXINE 100 MG/1
100 TABLET, EXTENDED RELEASE ORAL DAILY
Qty: 30 TABLET | Refills: 3 | Status: SHIPPED | OUTPATIENT
Start: 2022-04-28 | End: 2022-04-28 | Stop reason: SDUPTHER

## 2022-04-28 NOTE — TELEPHONE ENCOUNTER
Rx Refill Note  Requested Prescriptions     Pending Prescriptions Disp Refills   • desvenlafaxine (PRISTIQ) 100 MG 24 hr tablet 30 tablet 3     Sig: Take 1 tablet by mouth Daily.      Last office visit with prescribing clinician: 4/13/2022      Next office visit with prescribing clinician: Visit date not found            Andres Renee MA  04/28/22, 09:39 EDT

## 2022-04-28 NOTE — TELEPHONE ENCOUNTER
Caller: Azalia Castle    Relationship: Self    Best call back number: [phone]    Requested Prescriptions:   Requested Prescriptions     Pending Prescriptions Disp Refills   • desvenlafaxine (PRISTIQ) 100 MG 24 hr tablet 30 tablet 3     Sig: Take 1 tablet by mouth Daily.        Pharmacy where request should be sent: [facility]/PHARMACY #[id number] - [place], [place] - [address] [place] [address] [phone] [facility] [phone] FX     Additional details provided by patient: ASKING FOR 90 DAY SUPPLY ALSO WAS SENT TO WRONG PHARMACY CAN IT BE RESENT TO [facility] T[facility]AR ROS[facility]     Does the patient have less than a 3 day supply:  [x] Yes  [] No    [facility], Lianet Rep   04/28/22 09:25 EDT

## 2022-05-04 ENCOUNTER — APPOINTMENT (OUTPATIENT)
Dept: WOMENS IMAGING | Facility: HOSPITAL | Age: 62
End: 2022-05-04

## 2022-05-04 PROCEDURE — 77067 SCR MAMMO BI INCL CAD: CPT | Performed by: RADIOLOGY

## 2022-05-04 PROCEDURE — 77063 BREAST TOMOSYNTHESIS BI: CPT | Performed by: RADIOLOGY

## 2022-07-25 RX ORDER — DESVENLAFAXINE 100 MG/1
TABLET, EXTENDED RELEASE ORAL
Qty: 90 TABLET | Refills: 1 | Status: SHIPPED | OUTPATIENT
Start: 2022-07-25

## 2022-09-27 ENCOUNTER — OFFICE VISIT (OUTPATIENT)
Dept: INTERNAL MEDICINE | Facility: CLINIC | Age: 62
End: 2022-09-27

## 2022-09-27 ENCOUNTER — TELEPHONE (OUTPATIENT)
Dept: INTERNAL MEDICINE | Facility: CLINIC | Age: 62
End: 2022-09-27

## 2022-09-27 VITALS
DIASTOLIC BLOOD PRESSURE: 88 MMHG | HEART RATE: 94 BPM | RESPIRATION RATE: 16 BRPM | OXYGEN SATURATION: 97 % | SYSTOLIC BLOOD PRESSURE: 130 MMHG | TEMPERATURE: 97 F | BODY MASS INDEX: 27.34 KG/M2 | HEIGHT: 60 IN

## 2022-09-27 DIAGNOSIS — J01.40 ACUTE NON-RECURRENT PANSINUSITIS: Primary | ICD-10-CM

## 2022-09-27 PROCEDURE — 99213 OFFICE O/P EST LOW 20 MIN: CPT | Performed by: INTERNAL MEDICINE

## 2022-09-27 RX ORDER — AMOXICILLIN AND CLAVULANATE POTASSIUM 875; 125 MG/1; MG/1
1 TABLET, FILM COATED ORAL 2 TIMES DAILY
Qty: 14 TABLET | Refills: 0 | Status: SHIPPED | OUTPATIENT
Start: 2022-09-27 | End: 2022-10-18

## 2022-09-27 RX ORDER — DOXYCYCLINE HYCLATE 100 MG/1
100 CAPSULE ORAL 2 TIMES DAILY
Qty: 14 CAPSULE | Refills: 0 | Status: SHIPPED | OUTPATIENT
Start: 2022-09-27 | End: 2022-10-18

## 2022-09-27 NOTE — TELEPHONE ENCOUNTER
When we went over allergies in the office she said she was allergic to sulfa and I asked about PCN and she said she was ok, is there something about the Augmentin specifically? This is not a sulfa drug

## 2022-09-27 NOTE — TELEPHONE ENCOUNTER
Hub staff attempted to follow warm transfer process and was unsuccessful     Caller: Azalia Castle    Relationship to patient: Self    Best call back number: [phone]    Patient is needing: THE PATIENT STATES THAT SHE WAS PRESCRIBED A MEDICATION THAT SHE WAS ALLERGIC TO. THIS MEDICATION IS THE amoxicillin-clavulanate (Augmentin) 875-125 MG per tablet. PLEASE ADVISE.

## 2022-09-27 NOTE — PROGRESS NOTES
"Chief Complaint  Cough    Subjective          Azalia Castle presents to [facility] for cough and congestion. Has been ongoing x 5 days. Started with cough, rhinorrhea and has progressed, now feels like it is down in her chest. Cough is productive of thick mucus. No SOB or difficulty breathing. Has chills but no fever or body aches. Took home COVID test that was negative.   No sick contacts.     Objective   Vital Signs:     /88   Pulse 94   Temp 97 °F (36.1 °C)   Resp 16   Ht 152.4 cm (60\")   SpO2 97%   BMI 27.34 kg/m²     Physical Exam  Vitals and nursing note reviewed.   Constitutional:       General: She is not in acute distress.     Appearance: Normal appearance.   HENT:      Right Ear: Ear canal and external ear normal. A middle ear effusion is present. Tympanic membrane is not erythematous.      Left Ear: Ear canal and external ear normal. A middle ear effusion is present. Tympanic membrane is not erythematous.      Nose: Congestion present.      Right Sinus: Maxillary sinus tenderness and frontal sinus tenderness present.      Left Sinus: Maxillary sinus tenderness and frontal sinus tenderness present.      Mouth/Throat:      Pharynx: Pharyngeal swelling and posterior oropharyngeal erythema present. No oropharyngeal exudate.      Comments: + cobbelstoning  Cardiovascular:      Rate and Rhythm: Normal rate and regular rhythm.      Pulses: Normal pulses.      Heart sounds: Normal heart sounds. No murmur heard.  Pulmonary:      Effort: Pulmonary effort is normal. No respiratory distress.      Breath sounds: Normal breath sounds.   Abdominal:      General: Bowel sounds are normal. There is no distension.      Palpations: Abdomen is soft.   Lymphadenopathy:      Cervical: Cervical adenopathy present.   Skin:     General: Skin is warm.      Capillary Refill: Capillary refill takes less than 2 seconds.   Neurological:      Mental Status: She is alert " and oriented to person, place, and time. Mental status is at baseline.          Result Review : : None     Assessment and Plan      Diagnoses and all orders for this visit:    1. Acute non-recurrent pansinusitis (Primary)  -     amoxicillin-clavulanate (Augmentin) 875-125 MG per tablet; Take 1 tablet by mouth 2 (Two) Times a Day.  Dispense: 14 tablet; Refill: 0    - Augmentin as below for acute sinusitis  - increase fluids and rest  - cont OTC meds like tylenol and ibuprofen as needed for fever/pain  - should take OTC antihistamine, nasal corticosteroid, and decongestant  - call back if not improving after 48-72 hours      Follow Up   Return if symptoms worsen or fail to improve.    Patient was given instructions and counseling regarding her condition or for health maintenance advice. Please see specific information pulled into the AVS if appropriate.     Mary Layne MD  [facility] Internal Medicine and Pediatrics  Phone: [phone]  Fax: [phone]

## 2022-10-16 ENCOUNTER — DOCUMENTATION (OUTPATIENT)
Dept: INTERNAL MEDICINE | Facility: CLINIC | Age: 62
End: 2022-10-16

## 2022-10-16 RX ORDER — NITROFURANTOIN 25; 75 MG/1; MG/1
100 CAPSULE ORAL 2 TIMES DAILY
Qty: 10 CAPSULE | Refills: 0 | Status: SHIPPED | OUTPATIENT
Start: 2022-10-16 | End: 2022-10-21

## 2022-10-17 NOTE — PROGRESS NOTES
Called by patient on Sun 10/16 with concerns for UTI with inc frequency/hesitancy, small amount of blood, no fevers/n/v/cva pain. We discussed ideally should collect UA and culture to ensure this is UTI and also to know organism for organism directed antibiotics. She prefers to not go to urgent care, macrobid sent in, needs to be seen in office if not improved in next 48 hours or if fevers, n/v/cva tenderness develop.    Jakob Landin MD   [facility] Internal Medicine and Pediatrics

## 2022-10-18 ENCOUNTER — OFFICE VISIT (OUTPATIENT)
Dept: INTERNAL MEDICINE | Facility: CLINIC | Age: 62
End: 2022-10-18

## 2022-10-18 VITALS
WEIGHT: 136 LBS | RESPIRATION RATE: 18 BRPM | OXYGEN SATURATION: 98 % | DIASTOLIC BLOOD PRESSURE: 72 MMHG | BODY MASS INDEX: 26.7 KG/M2 | HEART RATE: 72 BPM | HEIGHT: 60 IN | SYSTOLIC BLOOD PRESSURE: 128 MMHG | TEMPERATURE: 98.4 F

## 2022-10-18 DIAGNOSIS — N95.2 VAGINAL ATROPHY: ICD-10-CM

## 2022-10-18 DIAGNOSIS — R30.0 DYSURIA: Primary | ICD-10-CM

## 2022-10-18 LAB
BILIRUB BLD-MCNC: NEGATIVE MG/DL
CLARITY, POC: CLEAR
COLOR UR: YELLOW
EXPIRATION DATE: NORMAL
GLUCOSE UR STRIP-MCNC: NEGATIVE MG/DL
KETONES UR QL: NEGATIVE
LEUKOCYTE EST, POC: NEGATIVE
Lab: NORMAL
NITRITE UR-MCNC: NEGATIVE MG/ML
PH UR: 6 [PH] (ref 5–8)
PROT UR STRIP-MCNC: NEGATIVE MG/DL
RBC # UR STRIP: NEGATIVE /UL
SP GR UR: 1.01 (ref 1–1.03)
UROBILINOGEN UR QL: NORMAL

## 2022-10-18 PROCEDURE — 99214 OFFICE O/P EST MOD 30 MIN: CPT | Performed by: INTERNAL MEDICINE

## 2022-10-18 PROCEDURE — 81003 URINALYSIS AUTO W/O SCOPE: CPT | Performed by: INTERNAL MEDICINE

## 2022-10-18 RX ORDER — CIPROFLOXACIN 500 MG/1
500 TABLET, FILM COATED ORAL 2 TIMES DAILY
Qty: 10 TABLET | Refills: 0 | Status: SHIPPED | OUTPATIENT
Start: 2022-10-18 | End: 2022-10-23

## 2022-10-18 RX ORDER — CONJUGATED ESTROGENS 0.62 MG/G
CREAM VAGINAL DAILY
Qty: 1 EACH | Refills: 2 | Status: SHIPPED | OUTPATIENT
Start: 2022-10-18

## 2022-10-20 LAB
BACTERIA UR CULT: NORMAL
BACTERIA UR CULT: NORMAL

## 2022-10-25 NOTE — PROGRESS NOTES
"Chief Complaint  Dysuria (X 4 days )    Subjective        Azalia Castle presents to [facility]  History of Present Illness  Here with few days of dysuria, no fever, is having urgency, frequency      Objective   Vital Signs:  /72   Pulse 72   Temp 98.4 °F (36.9 °C)   Resp 18   Ht 152.4 cm (60\")   Wt 61.7 kg (136 lb)   SpO2 98%   BMI 26.56 kg/m²   Estimated body mass index is 26.56 kg/m² as calculated from the following:    Height as of this encounter: 152.4 cm (60\").    Weight as of this encounter: 61.7 kg (136 lb).          Physical Exam  Vitals and nursing note reviewed.   Constitutional:       Appearance: Normal appearance.   HENT:      Head: Normocephalic and atraumatic.      Right Ear: Tympanic membrane, ear canal and external ear normal.      Left Ear: Tympanic membrane, ear canal and external ear normal.      Nose: Nose normal.      Mouth/Throat:      Mouth: Mucous membranes are moist.      Pharynx: Oropharynx is clear.   Eyes:      Extraocular Movements: Extraocular movements intact.      Conjunctiva/sclera: Conjunctivae normal.      Pupils: Pupils are equal, round, and reactive to light.   Cardiovascular:      Rate and Rhythm: Normal rate and regular rhythm.      Pulses: Normal pulses.      Heart sounds: Normal heart sounds.   Pulmonary:      Effort: Pulmonary effort is normal.      Breath sounds: Normal breath sounds. No wheezing, rhonchi or rales.   Abdominal:      General: Abdomen is flat. There is no distension.      Palpations: Abdomen is soft.      Tenderness: There is no abdominal tenderness.   Musculoskeletal:         General: Normal range of motion.      Cervical back: Normal range of motion and neck supple.      Right lower leg: No edema.      Left lower leg: No edema.   Skin:     General: Skin is warm and dry.      Capillary Refill: Capillary refill takes less than 2 seconds.      Findings: No rash.   Neurological:      General: No " focal deficit present.      Mental Status: She is alert and oriented to person, place, and time. Mental status is at baseline.   Psychiatric:         Mood and Affect: Mood normal.         Behavior: Behavior normal.        Result Review :                Assessment and Plan   Diagnoses and all orders for this visit:    1. Dysuria (Primary)  -     POCT urinalysis dipstick, automated  -     Urine Culture - Urine, Urine, Clean Catch; Future  -     Urine Culture - Urine, Urine, Clean Catch    2. Vaginal atrophy    Other orders  -     Estrogens Conjugated (Premarin) 0.625 MG/GM vaginal cream; Insert  into the vagina Daily.  Dispense: 1 each; Refill: 2  -     ciprofloxacin (Cipro) 500 MG tablet; Take 1 tablet by mouth 2 (Two) Times a Day for 5 days.  Dispense: 10 tablet; Refill: 0    - check labs as above  - start cipro for cystisis  - start premarin for post menopausal atrophy changes; susepcet this will help her dyspaeurnia as well as her dryness and predisposition to cystitis  - discussed risks/benefits/alternatives of meds/treatments  - rtc to follow up 2-4 weeks         Follow Up   Return in about 3 months (around 1/18/2023).  Patient was given instructions and counseling regarding her condition or for health maintenance advice. Please see specific information pulled into the AVS if appropriate.

## 2023-05-03 RX ORDER — DESVENLAFAXINE 100 MG/1
TABLET, EXTENDED RELEASE ORAL
Qty: 90 TABLET | Refills: 1 | Status: SHIPPED | OUTPATIENT
Start: 2023-05-03

## 2023-05-03 NOTE — TELEPHONE ENCOUNTER
Rx Refill Note  Requested Prescriptions     Pending Prescriptions Disp Refills   • desvenlafaxine (PRISTIQ) 100 MG 24 hr tablet [Pharmacy Med Name: DESVENLAFAXINE SUCCNT ER 100MG] 90 tablet 1     Sig: TAKE 1 TABLET BY MOUTH EVERY DAY      Last office visit with prescribing clinician: 10/18/2022   Last telemedicine visit with prescribing clinician: Visit date not found   Next office visit with prescribing clinician: Visit date not found                         Would you like a call back once the refill request has been completed: [] Yes [] No    If the office needs to give you a call back, can they leave a voicemail: [] Yes [] No    Andres Renee MA  05/03/23, 08:00 EDT

## 2023-07-22 ENCOUNTER — DOCUMENTATION (OUTPATIENT)
Dept: INTERNAL MEDICINE | Facility: CLINIC | Age: 63
End: 2023-07-22
Payer: COMMERCIAL

## 2023-07-22 RX ORDER — DEXTROMETHORPHAN HYDROBROMIDE AND PROMETHAZINE HYDROCHLORIDE 15; 6.25 MG/5ML; MG/5ML
2.5 SYRUP ORAL 4 TIMES DAILY
Qty: 200 ML | Refills: 0 | Status: SHIPPED | OUTPATIENT
Start: 2023-07-22 | End: 2023-08-01

## 2023-10-30 ENCOUNTER — OFFICE VISIT (OUTPATIENT)
Dept: INTERNAL MEDICINE | Facility: CLINIC | Age: 63
End: 2023-10-30
Payer: COMMERCIAL

## 2023-10-30 VITALS
OXYGEN SATURATION: 98 % | BODY MASS INDEX: 27.7 KG/M2 | HEART RATE: 78 BPM | SYSTOLIC BLOOD PRESSURE: 130 MMHG | WEIGHT: 141.1 LBS | HEIGHT: 60 IN | RESPIRATION RATE: 18 BRPM | DIASTOLIC BLOOD PRESSURE: 80 MMHG

## 2023-10-30 DIAGNOSIS — Z00.00 WELL ADULT EXAM: Primary | ICD-10-CM

## 2023-10-30 DIAGNOSIS — E55.9 VITAMIN D DEFICIENCY: ICD-10-CM

## 2023-10-30 DIAGNOSIS — E03.8 SUBCLINICAL HYPOTHYROIDISM: ICD-10-CM

## 2023-10-30 DIAGNOSIS — Z12.11 ENCOUNTER FOR SCREENING FOR MALIGNANT NEOPLASM OF COLON: ICD-10-CM

## 2023-10-30 DIAGNOSIS — J30.9 ALLERGIC RHINITIS, UNSPECIFIED SEASONALITY, UNSPECIFIED TRIGGER: ICD-10-CM

## 2023-10-30 DIAGNOSIS — Z13.220 LIPID SCREENING: ICD-10-CM

## 2023-10-30 DIAGNOSIS — F33.9 RECURRENT MAJOR DEPRESSIVE DISORDER, REMISSION STATUS UNSPECIFIED: ICD-10-CM

## 2023-10-30 DIAGNOSIS — F41.1 GAD (GENERALIZED ANXIETY DISORDER): ICD-10-CM

## 2023-10-30 NOTE — PROGRESS NOTES
"Chief Complaint   Patient presents with    Employment Physical       Subjective   Azalia Castle is a 62 y.o. female.     History of Present Illness     The following portions of the patient's history were reviewed and updated as appropriate: allergies, current medications, past family history, past medical history, past social history, past surgical history, and problem list.    Review of Systems      Objective   Body mass index is 27.56 kg/m².   Vitals:    10/30/23 0942   BP: 130/80   Pulse: 78   Resp: 18   SpO2: 98%   Weight: 64 kg (141 lb 1.6 oz)   Height: 152.4 cm (60\")        Physical Exam  Vitals and nursing note reviewed.   Constitutional:       Appearance: Normal appearance.   HENT:      Head: Normocephalic and atraumatic.      Right Ear: Tympanic membrane, ear canal and external ear normal.      Left Ear: Tympanic membrane, ear canal and external ear normal.      Nose: Nose normal.      Mouth/Throat:      Mouth: Mucous membranes are moist.      Pharynx: Oropharynx is clear.   Eyes:      Extraocular Movements: Extraocular movements intact.      Conjunctiva/sclera: Conjunctivae normal.      Pupils: Pupils are equal, round, and reactive to light.   Cardiovascular:      Rate and Rhythm: Normal rate and regular rhythm.      Pulses: Normal pulses.      Heart sounds: Normal heart sounds.   Pulmonary:      Effort: Pulmonary effort is normal.      Breath sounds: Normal breath sounds. No wheezing, rhonchi or rales.   Abdominal:      General: Abdomen is flat. There is no distension.      Palpations: Abdomen is soft.      Tenderness: There is no abdominal tenderness.   Musculoskeletal:         General: Normal range of motion.      Cervical back: Normal range of motion and neck supple.      Right lower leg: No edema.      Left lower leg: No edema.   Skin:     General: Skin is warm and dry.      Capillary Refill: Capillary refill takes less than 2 seconds.      Findings: No rash.   Neurological:      General: No " focal deficit present.      Mental Status: She is alert and oriented to person, place, and time. Mental status is at baseline.   Psychiatric:         Mood and Affect: Mood normal.         Behavior: Behavior normal.           Current Outpatient Medications:     aspirin 81 MG EC tablet, Take 1 tablet by mouth Daily., Disp: , Rfl:     azelastine (OPTIVAR) 0.05 % ophthalmic solution, INSTILL 1 DROP IN EACH EYE TWICE DAILY AS NEEDED, Disp: , Rfl:     Cholecalciferol 50 MCG (2000 UT) tablet, Take  by mouth., Disp: , Rfl:     desvenlafaxine (PRISTIQ) 100 MG 24 hr tablet, TAKE 1 TABLET BY MOUTH EVERY DAY, Disp: 90 tablet, Rfl: 1    raloxifene (EVISTA) 60 MG tablet, Take 1 tablet by mouth Daily., Disp: , Rfl:     buPROPion XL (Wellbutrin XL) 150 MG 24 hr tablet, Take 1 tablet by mouth Daily., Disp: 30 tablet, Rfl: 3    Estrogens Conjugated (Premarin) 0.625 MG/GM vaginal cream, Insert  into the vagina Daily., Disp: 1 each, Rfl: 2     Lab Results   Component Value Date    HGBA1C 5.4 04/13/2022    TSH 2.000 04/13/2022    [id number] 61.0 04/13/2022        Health Maintenance   Topic Date Due    MAMMOGRAM  Never done    DXA SCAN  Never done    BMI FOLLOWUP  Never done    COLORECTAL CANCER SCREENING  Never done    ZOSTER VACCINE (1 of 2) Never done    PAP SMEAR  Never done    ANNUAL PHYSICAL  04/13/2023    LIPID PANEL  04/13/2023    INFLUENZA VACCINE  Never done    COVID-19 Vaccine (3 - 2023-24 season) 09/01/2023    TDAP/TD VACCINES (2 - Td or Tdap) 03/16/2029    HEPATITIS C SCREENING  Completed    Pneumococcal Vaccine 0-64  Aged Out        Immunization History   Administered Date(s) Administered    COVID-19 (PFIZER) Purple Cap Monovalent 03/27/2021, 04/17/2021    Tdap 03/16/2019         Assessment & Plan   Diagnoses and all orders for this visit:    1. Well adult exam (Primary)    2. Vitamin D deficiency    3. Encounter for screening for malignant neoplasm of colon    4. Lipid screening    5. HOME (generalized anxiety disorder)    6.  Recurrent major depressive disorder, remission status unspecified      Patient is doing well. Concerns today include needing a colonoscopy, last was 11 years ago, told to follow up in 10 years but planned on doing the stool test instead. Counseled today about risk/benefits. Advised that given the fact that she is currently in good health, rec getting C-scope. Will refer today for colonoscopy. Her Mammo and pap are handled by her GYN, reports that mammo was normal this year, pap due next year. Will check annual labs today. Recommended temporary addition of flonase to regimen for a few weeks to help with watery eyes but to avoid use more than two weeks for now given hx of osteoporosis, she does see an allergist and gets allergy shots. She is doing well on Pristiq, will cont.          Well adult exam  - labs checked and evaluated    PAP - Next pap next year  Mammogram - Yearly with OB, normal  Colonoscopy - Needs referral  Glaucoma - yearly  AAA - na  Lung cancer - na  DXA - at 65  HIV - neg 2022  HCV - neg 2022  DM - checking  HLD - checking  Smoking - no  Depression - no, [id number]  Vaccines - does not get flu shot  Falls - no  Alcohol Screening - no    Discussed mental health, sexual health, substance use, abuse, anticipatory guidance given.      No follow-ups on file.     Yoseph Barros MD  [facility] [place]  Internal Medicine and Pediatrics  Phone: [phone]  Fax: [phone]

## 2023-10-31 LAB
25(OH)D3+25(OH)D2 SERPL-MCNC: 66.9 NG/ML (ref 30–100)
ALBUMIN SERPL-MCNC: 4.5 G/DL (ref 3.5–5.2)
ALBUMIN/GLOB SERPL: 2.3 G/DL
ALP SERPL-CCNC: 111 U/L (ref 39–117)
ALT SERPL-CCNC: 19 U/L (ref 1–33)
AST SERPL-CCNC: 18 U/L (ref 1–32)
BASOPHILS # BLD AUTO: 0.06 10*3/MM3 (ref 0–0.2)
BASOPHILS NFR BLD AUTO: 1 % (ref 0–1.5)
BILIRUB SERPL-MCNC: 0.3 MG/DL (ref 0–1.2)
BUN SERPL-MCNC: 16 MG/DL (ref 8–23)
BUN/CREAT SERPL: 22.2 (ref 7–25)
CALCIUM SERPL-MCNC: 9.7 MG/DL (ref 8.6–10.5)
CHLORIDE SERPL-SCNC: 104 MMOL/L (ref 98–107)
CHOLEST SERPL-MCNC: 242 MG/DL (ref 0–200)
CHOLEST/HDLC SERPL: 3.72 {RATIO}
CO2 SERPL-SCNC: 24.6 MMOL/L (ref 22–29)
CREAT SERPL-MCNC: 0.72 MG/DL (ref 0.57–1)
EGFRCR SERPLBLD CKD-EPI 2021: 94.7 ML/MIN/1.73
EOSINOPHIL # BLD AUTO: 0.24 10*3/MM3 (ref 0–0.4)
EOSINOPHIL NFR BLD AUTO: 3.9 % (ref 0.3–6.2)
ERYTHROCYTE [DISTWIDTH] IN BLOOD BY AUTOMATED COUNT: 11.7 % (ref 12.3–15.4)
GLOBULIN SER CALC-MCNC: 2 GM/DL
GLUCOSE SERPL-MCNC: 103 MG/DL (ref 65–99)
HBA1C MFR BLD: 5.2 % (ref 4.8–5.6)
HCT VFR BLD AUTO: 40 % (ref 34–46.6)
HDLC SERPL-MCNC: 65 MG/DL (ref 40–60)
HGB BLD-MCNC: 13.9 G/DL (ref 12–15.9)
IMM GRANULOCYTES # BLD AUTO: 0.02 10*3/MM3 (ref 0–0.05)
IMM GRANULOCYTES NFR BLD AUTO: 0.3 % (ref 0–0.5)
LDLC SERPL CALC-MCNC: 155 MG/DL (ref 0–100)
LYMPHOCYTES # BLD AUTO: 2.6 10*3/MM3 (ref 0.7–3.1)
LYMPHOCYTES NFR BLD AUTO: 42.4 % (ref 19.6–45.3)
MAGNESIUM SERPL-MCNC: 2.2 MG/DL (ref 1.6–2.4)
MCH RBC QN AUTO: 32 PG (ref 26.6–33)
MCHC RBC AUTO-ENTMCNC: 34.8 G/DL (ref 31.5–35.7)
MCV RBC AUTO: 92 FL (ref 79–97)
MONOCYTES # BLD AUTO: 0.57 10*3/MM3 (ref 0.1–0.9)
MONOCYTES NFR BLD AUTO: 9.3 % (ref 5–12)
NEUTROPHILS # BLD AUTO: 2.64 10*3/MM3 (ref 1.7–7)
NEUTROPHILS NFR BLD AUTO: 43.1 % (ref 42.7–76)
NRBC BLD AUTO-RTO: 0 /100 WBC (ref 0–0.2)
PLATELET # BLD AUTO: 259 10*3/MM3 (ref 140–450)
POTASSIUM SERPL-SCNC: 4.4 MMOL/L (ref 3.5–5.2)
PROT SERPL-MCNC: 6.5 G/DL (ref 6–8.5)
RBC # BLD AUTO: 4.35 10*6/MM3 (ref 3.77–5.28)
SODIUM SERPL-SCNC: 140 MMOL/L (ref 136–145)
TRIGL SERPL-MCNC: 124 MG/DL (ref 0–150)
TSH SERPL DL<=0.005 MIU/L-ACNC: 4.43 UIU/ML (ref 0.27–4.2)
VLDLC SERPL CALC-MCNC: 22 MG/DL (ref 5–40)
WBC # BLD AUTO: 6.13 10*3/MM3 (ref 3.4–10.8)

## 2023-11-01 ENCOUNTER — TELEPHONE (OUTPATIENT)
Dept: INTERNAL MEDICINE | Facility: CLINIC | Age: 63
End: 2023-11-01
Payer: COMMERCIAL

## 2023-11-01 NOTE — TELEPHONE ENCOUNTER
PATIENT CALLED BACK AND ASKED FOR DR SAINI TO CALL HER BACK REGARDING THIS ASAP    CONTACT:     [phone] (Mobile)

## 2023-11-01 NOTE — TELEPHONE ENCOUNTER
Caller: Azalia Castle    Relationship: Self    Best call back number: [phone]     Caller requesting test results: YES    What test was performed: LABS    When was the test performed: 10/30/23    Where was the test performed: IN OFFICE    Additional notes: PATIENT WOULD LIKE TO BE CONTACTED TO DISCUSS RESULTS

## 2023-11-21 LAB
T4 FREE SERPL-MCNC: 0.95 NG/DL (ref 0.93–1.7)
THYROPEROXIDASE AB SERPL-ACNC: 11 IU/ML (ref 0–34)
TSH SERPL DL<=0.005 MIU/L-ACNC: 2.22 UIU/ML (ref 0.27–4.2)

## 2024-03-01 RX ORDER — DESVENLAFAXINE 100 MG/1
100 TABLET, EXTENDED RELEASE ORAL DAILY
Qty: 14 TABLET | Refills: 0 | Status: SHIPPED | OUTPATIENT
Start: 2024-03-01

## 2024-03-06 RX ORDER — DESVENLAFAXINE 100 MG/1
100 TABLET, EXTENDED RELEASE ORAL DAILY
Qty: 14 TABLET | Refills: 0 | Status: SHIPPED | OUTPATIENT
Start: 2024-03-06

## 2024-03-13 ENCOUNTER — OFFICE VISIT (OUTPATIENT)
Dept: INTERNAL MEDICINE | Facility: CLINIC | Age: 64
End: 2024-03-13
Payer: COMMERCIAL

## 2024-03-13 VITALS
HEART RATE: 85 BPM | OXYGEN SATURATION: 94 % | DIASTOLIC BLOOD PRESSURE: 84 MMHG | WEIGHT: 150 LBS | BODY MASS INDEX: 29.45 KG/M2 | HEIGHT: 60 IN | SYSTOLIC BLOOD PRESSURE: 122 MMHG | TEMPERATURE: 98.7 F

## 2024-03-13 DIAGNOSIS — F33.40 RECURRENT MAJOR DEPRESSIVE DISORDER, IN REMISSION: ICD-10-CM

## 2024-03-13 DIAGNOSIS — E66.3 OVERWEIGHT WITH BODY MASS INDEX (BMI) 25.0-29.9: ICD-10-CM

## 2024-03-13 DIAGNOSIS — R14.0 POSTPRANDIAL ABDOMINAL BLOATING: Primary | ICD-10-CM

## 2024-03-13 DIAGNOSIS — Z76.89 ENCOUNTER TO ESTABLISH CARE: ICD-10-CM

## 2024-03-13 DIAGNOSIS — E78.00 PURE HYPERCHOLESTEROLEMIA: ICD-10-CM

## 2024-03-13 DIAGNOSIS — M81.0 OSTEOPOROSIS WITHOUT CURRENT PATHOLOGICAL FRACTURE, UNSPECIFIED OSTEOPOROSIS TYPE: ICD-10-CM

## 2024-03-13 DIAGNOSIS — F41.1 GAD (GENERALIZED ANXIETY DISORDER): ICD-10-CM

## 2024-03-13 DIAGNOSIS — E55.9 VITAMIN D DEFICIENCY: ICD-10-CM

## 2024-03-13 RX ORDER — SIMETHICONE 80 MG
80 TABLET,CHEWABLE ORAL EVERY 6 HOURS PRN
Qty: 30 TABLET | Refills: 3 | Status: SHIPPED | OUTPATIENT
Start: 2024-03-13

## 2024-03-13 RX ORDER — AZELASTINE HYDROCHLORIDE 0.5 MG/ML
1 SOLUTION/ DROPS OPHTHALMIC DAILY
COMMUNITY

## 2024-03-13 RX ORDER — DESVENLAFAXINE 100 MG/1
100 TABLET, EXTENDED RELEASE ORAL DAILY
Qty: 90 TABLET | Refills: 3 | Status: SHIPPED | OUTPATIENT
Start: 2024-03-13

## 2024-03-13 RX ORDER — FAMOTIDINE 20 MG/1
20 TABLET, FILM COATED ORAL 2 TIMES DAILY PRN
Qty: 30 TABLET | Refills: 5 | Status: SHIPPED | OUTPATIENT
Start: 2024-03-13

## 2024-03-13 RX ORDER — PREDNISOLONE ACETATE 10 MG/ML
1 SUSPENSION/ DROPS OPHTHALMIC 2 TIMES DAILY
COMMUNITY
Start: 2024-02-22

## 2024-03-13 NOTE — PROGRESS NOTES
"Chief Complaint  Establish Care, Med Refill, Anxiety, and Depression    Subjective        Azalia Castle presents to [facility]  History of Present Illness  Azalia is an established patient of Yoseph Barros MD (10/30/2023 note reviewed), presenting to establish care with new provider and for management of anxiety/depression, abdominal pain.  She has anxiety/depression, vitamin D deficiency, chronic dry eye.  Also follows with Bozena Armstrong MD, OB/GYN, [facility] dermatology, allergist for allergy shots.  Reviewed last labs from 10/30/2023 which show CBC normal, CMP normal, A1c normal, vitamin D normal, triglycerides normal, elevated cholesterol.  2023 labs showing normal TSH and FT4.    Abdominal pain: Reports occasional abdominal bloating associated with spicy foods or soft drinks.  Has cut back on sodas which has helped some.  Also reports occasional acid reflux.    Anxiety/depression: Symptoms first started following the death of her father 14 years ago.  She says she is still \"a mess\".  Would like to continue Pristiq because she feels it helps her symptoms.    Hypercholesterolemia: Managing with diet.    Osteoporosis/Vitamin D deficiency: Managed with vitamin D supplement and raloxifene prescribed by OB/GYN.    Chronic dry eye: Follows with Jose.  Managed with multiple eyedrops.    ROS: Denies headaches, changes in vision, changes in hearing, sore throat, shortness of breath, palpitations, nausea/vomiting/constipation/diarrhea, abdominal pain, blood in urine or stool, leg swelling.  Denies thoughts of hurting herself or others.    FH:     Medical: Father with hyperlipidemia.    Siblings: 1 sister, healthy, close by    Children: 3 children, healthy, living close by    Reproductive: .  Pap/HPV reports , history of abnormal Pap 40y ago.  History of WSM, monogamous with huusband.    Objective   Vital Signs:  /84   Pulse 85   " "Temp 98.7 °F (37.1 °C)   Ht 152.4 cm (60\")   Wt 68 kg (150 lb)   SpO2 94%   BMI 29.29 kg/m²   Estimated body mass index is 29.29 kg/m² as calculated from the following:    Height as of this encounter: 152.4 cm (60\").    Weight as of this encounter: 68 kg (150 lb).       BMI is >= 25 and <30. (Overweight) The following options were offered after discussion;: exercise counseling/recommendations and nutrition counseling/recommendations      Physical Exam  Vitals and nursing note reviewed.   Constitutional:       General: She is not in acute distress.     Appearance: Normal appearance.   HENT:      Head: Normocephalic and atraumatic.   Eyes:      Extraocular Movements: Extraocular movements intact.      Conjunctiva/sclera: Conjunctivae normal.   Cardiovascular:      Rate and Rhythm: Normal rate and regular rhythm.      Pulses: Normal pulses.      Heart sounds: Normal heart sounds. No murmur heard.     No friction rub. No gallop.   Pulmonary:      Effort: Pulmonary effort is normal.      Breath sounds: Normal breath sounds. No wheezing, rhonchi or rales.   Abdominal:      General: Abdomen is flat. Bowel sounds are normal. There is no distension.      Palpations: Abdomen is soft.      Tenderness: There is no abdominal tenderness. There is no right CVA tenderness, left CVA tenderness or guarding.   Musculoskeletal:      Right lower leg: No edema.      Left lower leg: No edema.   Skin:     General: Skin is warm and dry.      Capillary Refill: Capillary refill takes less than 2 seconds.   Neurological:      General: No focal deficit present.      Mental Status: She is alert. Mental status is at baseline.   Psychiatric:         Mood and Affect: Mood normal.         Behavior: Behavior normal.        Result Review :                   Assessment and Plan   Diagnoses and all orders for this visit:    1. Postprandial abdominal bloating (Primary)  -     simethicone (Gas-X) 80 MG chewable tablet; Chew 1 tablet Every 6 (Six) " Hours As Needed for Flatulence.  Dispense: 30 tablet; Refill: 3  -     famotidine (Pepcid) 20 MG tablet; Take 1 tablet by mouth 2 (Two) Times a Day As Needed for Heartburn or Indigestion. Works best if taken 1 hour before or 2 hours after a meal.  Dispense: 30 tablet; Refill: 5    2. HOME (generalized anxiety disorder)  -     desvenlafaxine (PRISTIQ) 100 MG 24 hr tablet; Take 1 tablet by mouth Daily.  Dispense: 90 tablet; Refill: 3  -     CBC & Differential; Future  -     Comprehensive Metabolic Panel; Future  -     TSH Rfx On Abnormal To Free T4; Future    3. Recurrent major depressive disorder, in remission  -     desvenlafaxine (PRISTIQ) 100 MG 24 hr tablet; Take 1 tablet by mouth Daily.  Dispense: 90 tablet; Refill: 3  -     CBC & Differential; Future  -     Comprehensive Metabolic Panel; Future  -     TSH Rfx On Abnormal To Free T4; Future    4. Pure hypercholesterolemia  -     Lipid Panel; Future    5. Osteoporosis without current pathological fracture, unspecified osteoporosis type    6. Vitamin D deficiency  -     Vitamin D,25-Hydroxy; Future    7. Overweight with body mass index (BMI) 25.0-29.9    8. Encounter to establish care    Abdominal pain: Adding Gas-X to help with bloating and famotidine to help with occasional acid reflux.  Strongly encouraged follow-up for colonoscopy.  A friend referred her to a particular physician and she says she will call to arrange.    Anxiety/depression: Overall, doing well with current Pristiq.  Continuing.    Hypercholesterolemia: Managing with diet.    Osteoporosis/Vitamin D deficiency: Managed with vitamin D supplement and raloxifene prescribed by OB/GYN.    Chronic dry eye: Follows with Jose.  Managed with multiple eyedrops.     Social/Preventative:    Risks: FH father with hyperlipidemia.    PAP/HPV: reports 2023, history of abnormal Pap 40y ago    Mammogram: Reports annually    Dexa: 2y ago, repeat scheduled    Colonoscopy: 11y ago.  Polyps.  A physician  was recommended to her and she says she will schedule this.    Vaccinations: No COVID or Influenza this year, Covid in the past.  Tetanus 2019.  Will get shingrix and RSV at pharmacy.      Eye: regularly    Dental: biennally    Foot exams: N/A    Nicotine: never    Illicit drugs: none    EtOH: ~4/wk    Home: Lives at home with , feels safe.  3 children and 1 sister living close by.    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    Social: Volunteer for [employer].  [place]      Exercise: 4d/week, weights, pilates, cardio.  Encouraged continuing regular exercise.    Diet: healthy, avoiding fried foods , vegetables, fruit, steak and salmon 1x/week.  Drinks water, 1/2 sweet tea 3x/week.  Encouraged cutting back further on red meat and improve diet overall.      Current Outpatient Medications:     aspirin 81 MG EC tablet, Take 1 tablet by mouth Daily., Disp: , Rfl:     Cholecalciferol 50 MCG (2000 UT) tablet, Take  by mouth., Disp: , Rfl:     desvenlafaxine (PRISTIQ) 100 MG 24 hr tablet, Take 1 tablet by mouth Daily., Disp: 90 tablet, Rfl: 3    prednisoLONE acetate (PRED FORTE) 1 % ophthalmic suspension, Administer 1 drop to both eyes 2 (Two) Times a Day., Disp: , Rfl:     raloxifene (EVISTA) 60 MG tablet, Take 1 tablet by mouth Daily., Disp: , Rfl:     azelastine (OPTIVAR) 0.05 % ophthalmic solution, Administer 1 drop to both eyes Daily., Disp: , Rfl:     famotidine (Pepcid) 20 MG tablet, Take 1 tablet by mouth 2 (Two) Times a Day As Needed for Heartburn or Indigestion. Works best if taken 1 hour before or 2 hours after a meal., Disp: 30 tablet, Rfl: 5    Polyvinyl Alcohol-Povidone PF (HYPOTEARS) 1.4-0.6 % ophthalmic solution, Administer 1 drop to both eyes As Needed (dry eyes)., Disp: , Rfl:     simethicone (Gas-X) 80 MG chewable tablet, Chew 1 tablet Every 6 (Six) Hours As Needed for Flatulence., Disp: 30 tablet, Rfl: 3     I spent 39 minutes caring for Azalia on this date of  service. This time includes time spent by me in the following activities:preparing for the visit, reviewing tests, obtaining and/or reviewing a separately obtained history, performing a medically appropriate examination and/or evaluation , counseling and educating the patient/family/caregiver, ordering medications, tests, or procedures, and documenting information in the medical record  Follow Up   Return in about 8 months (around 10/31/2024) for Annual physical.  Patient was given instructions and counseling regarding her condition or for health maintenance advice. Please see specific information pulled into the AVS if appropriate.

## 2024-03-15 PROBLEM — M81.0 OSTEOPOROSIS WITHOUT CURRENT PATHOLOGICAL FRACTURE: Status: ACTIVE | Noted: 2024-03-15

## 2024-03-15 PROBLEM — E55.9 VITAMIN D DEFICIENCY: Status: ACTIVE | Noted: 2024-03-15

## 2024-03-15 PROBLEM — E78.00 PURE HYPERCHOLESTEROLEMIA: Status: ACTIVE | Noted: 2024-03-15

## 2024-03-15 PROBLEM — H04.123 CHRONIC DRYNESS OF BOTH EYES: Status: ACTIVE | Noted: 2024-03-15

## 2024-03-22 ENCOUNTER — PATIENT ROUNDING (BHMG ONLY) (OUTPATIENT)
Dept: INTERNAL MEDICINE | Facility: CLINIC | Age: 64
End: 2024-03-22
Payer: COMMERCIAL

## 2024-03-22 NOTE — PROGRESS NOTES
A My-Chart message has been sent to the patient for Patient Rounding with [facility].

## 2024-06-26 ENCOUNTER — OFFICE VISIT (OUTPATIENT)
Dept: INTERNAL MEDICINE | Facility: CLINIC | Age: 64
End: 2024-06-26
Payer: COMMERCIAL

## 2024-06-26 VITALS
BODY MASS INDEX: 29.72 KG/M2 | WEIGHT: 151.4 LBS | OXYGEN SATURATION: 97 % | HEART RATE: 90 BPM | DIASTOLIC BLOOD PRESSURE: 84 MMHG | HEIGHT: 60 IN | SYSTOLIC BLOOD PRESSURE: 122 MMHG | TEMPERATURE: 98.4 F

## 2024-06-26 DIAGNOSIS — R60.0 BILATERAL LOWER EXTREMITY EDEMA: Primary | ICD-10-CM

## 2024-06-26 PROCEDURE — 99213 OFFICE O/P EST LOW 20 MIN: CPT | Performed by: STUDENT IN AN ORGANIZED HEALTH CARE EDUCATION/TRAINING PROGRAM

## 2024-06-26 RX ORDER — DIFLUPREDNATE OPHTHALMIC 0.5 MG/ML
EMULSION OPHTHALMIC
COMMUNITY
Start: 2024-03-28

## 2024-06-26 RX ORDER — HYDROCHLOROTHIAZIDE 12.5 MG/1
12.5 TABLET ORAL DAILY
Qty: 90 TABLET | Refills: 3 | Status: SHIPPED | OUTPATIENT
Start: 2024-06-26

## 2024-06-26 NOTE — PROGRESS NOTES
Chief Complaint  Lower extremity edema    Subjective        Azalia Castle presents to [facility]  History of Present Illness  History of Present Illness  Azalia is an established patient presenting with concerns for lower extremity edema.  She has anxiety/depression, vitamin D deficiency, chronic dry eye.  Also follows with Bozena Armstrong MD, OB/GYN, [facility], allergist for allergy shots.  Reviewed last labs from 10/30/2023 which show CBC normal, CMP normal, A1c normal, vitamin D normal, triglycerides normal, elevated cholesterol.  11/20/2023 labs showing normal TSH and FT4.     The patient reports experiencing water retention, particularly in her hands during walking when her hands are hanging down. Her  has observed that she is retaining water in her legs. The swelling is absent upon waking, but tends to worsen as the day progresses, manifesting in her knees and ankles. This swelling is particularly noticeable after prolonged periods of standing, such as dancing in high heels. She has experienced weight gain and maintains a healthy diet and regular exercise regimen, including Pilates and weight training. The leg swelling has been for at least a year and has progressively worsened in the past few weeks. She sleeps with a flat bed with one pillow and denies any breathing difficulties. Her diet does not include salt, and she has reduced her caloric intake from 1700 to 1500. Her daily fluid intake includes 1 to 2 Coke Zero, iced tea, and approximately 16 ounces of water. She consumes a cup of coffee in the morning and consumes a Mediterranean salad for lunch. She has a history of tummy tuck, but no other abdominal surgeries.    Her bloating issues have improved since her previous clinic visit 3/13/2024, which she attributes to her increased fiber intake.    Objective   Vital Signs:  /84   Pulse 90   Temp 98.4 °F (36.9 °C)   Ht 152.4 cm  "(60\")   Wt 68.7 kg (151 lb 6.4 oz)   SpO2 97%   BMI 29.57 kg/m²   Estimated body mass index is 29.57 kg/m² as calculated from the following:    Height as of this encounter: 152.4 cm (60\").    Weight as of this encounter: 68.7 kg (151 lb 6.4 oz).       Physical Exam  Vitals reviewed.   Constitutional:       General: She is not in acute distress.     Appearance: Normal appearance.   HENT:      Head: Normocephalic and atraumatic.   Eyes:      Extraocular Movements: Extraocular movements intact.      Conjunctiva/sclera: Conjunctivae normal.   Cardiovascular:      Rate and Rhythm: Normal rate and regular rhythm.      Heart sounds: Normal heart sounds. No murmur heard.     No friction rub. No gallop.   Pulmonary:      Effort: Pulmonary effort is normal.      Breath sounds: Normal breath sounds. No wheezing, rhonchi or rales.   Abdominal:      General: Bowel sounds are normal.      Palpations: Abdomen is soft.      Tenderness: There is no right CVA tenderness or left CVA tenderness.   Musculoskeletal:      Right lower leg: No edema.      Left lower leg: No edema.   Skin:     General: Skin is warm and dry.   Neurological:      General: No focal deficit present.      Mental Status: She is alert. Mental status is at baseline.   Psychiatric:         Mood and Affect: Mood normal.         Behavior: Behavior normal.        Physical Exam      Result Review :          Results               Assessment and Plan     Diagnoses and all orders for this visit:    1. Bilateral lower extremity edema (Primary)  -     hydroCHLOROthiazide 12.5 MG tablet; Take 1 tablet by mouth Daily.  Dispense: 90 tablet; Refill: 3  -     TSH  -     T4, free  -     Comprehensive metabolic panel      Assessment & Plan  1.  Lower extremity edema.  The patient's blood pressure is well-regulated, and her laboratory results from 10/2023 were within normal limits. A mild diuretic will be initiated, to be taken earlier in the day. Laboratory tests will be " conducted to establish a baseline, including electrolytes, thyroid, and protein levels. The patient has been advised to engage in physical activity, specifically walking for 5 minutes every hour, and to wear compression stockings.         Follow Up     Return in about 4 months (around 11/4/2024) for Annual physical.  Patient was given instructions and counseling regarding her condition or for health maintenance advice. Please see specific information pulled into the AVS if appropriate.     Patient or patient representative verbalized consent for the use of Ambient Listening during the visit with  Aston Smith MD for chart documentation. 6/26/2024  20:33 EDT

## 2024-06-27 LAB
ALBUMIN SERPL-MCNC: 4.7 G/DL (ref 3.5–5.2)
ALBUMIN/GLOB SERPL: 2.2 G/DL
ALP SERPL-CCNC: 147 U/L (ref 39–117)
ALT SERPL-CCNC: 16 U/L (ref 1–33)
AST SERPL-CCNC: 16 U/L (ref 1–32)
BILIRUB SERPL-MCNC: 0.2 MG/DL (ref 0–1.2)
BUN SERPL-MCNC: 21 MG/DL (ref 8–23)
BUN/CREAT SERPL: 28.4 (ref 7–25)
CALCIUM SERPL-MCNC: 9.9 MG/DL (ref 8.6–10.5)
CHLORIDE SERPL-SCNC: 104 MMOL/L (ref 98–107)
CO2 SERPL-SCNC: 24.2 MMOL/L (ref 22–29)
CREAT SERPL-MCNC: 0.74 MG/DL (ref 0.57–1)
EGFRCR SERPLBLD CKD-EPI 2021: 91 ML/MIN/1.73
GLOBULIN SER CALC-MCNC: 2.1 GM/DL
GLUCOSE SERPL-MCNC: 91 MG/DL (ref 65–99)
POTASSIUM SERPL-SCNC: 4.7 MMOL/L (ref 3.5–5.2)
PROT SERPL-MCNC: 6.8 G/DL (ref 6–8.5)
SODIUM SERPL-SCNC: 139 MMOL/L (ref 136–145)
T4 FREE SERPL-MCNC: 0.95 NG/DL (ref 0.92–1.68)
TSH SERPL DL<=0.005 MIU/L-ACNC: 2.84 UIU/ML (ref 0.27–4.2)

## 2024-10-29 ENCOUNTER — OFFICE VISIT (OUTPATIENT)
Dept: FAMILY MEDICINE CLINIC | Facility: CLINIC | Age: 64
End: 2024-10-29
Payer: COMMERCIAL

## 2024-10-29 VITALS
DIASTOLIC BLOOD PRESSURE: 72 MMHG | BODY MASS INDEX: 29.06 KG/M2 | OXYGEN SATURATION: 95 % | WEIGHT: 148 LBS | HEIGHT: 60 IN | SYSTOLIC BLOOD PRESSURE: 120 MMHG | TEMPERATURE: 97.8 F | HEART RATE: 80 BPM

## 2024-10-29 DIAGNOSIS — Z13.0 SCREENING FOR DEFICIENCY ANEMIA: ICD-10-CM

## 2024-10-29 DIAGNOSIS — M81.0 OSTEOPOROSIS WITHOUT CURRENT PATHOLOGICAL FRACTURE, UNSPECIFIED OSTEOPOROSIS TYPE: ICD-10-CM

## 2024-10-29 DIAGNOSIS — Z13.6 SCREENING FOR ISCHEMIC HEART DISEASE: ICD-10-CM

## 2024-10-29 DIAGNOSIS — R60.9 SWELLING: ICD-10-CM

## 2024-10-29 DIAGNOSIS — F51.01 PRIMARY INSOMNIA: Primary | ICD-10-CM

## 2024-10-29 DIAGNOSIS — R74.8 ELEVATED ALKALINE PHOSPHATASE LEVEL: ICD-10-CM

## 2024-10-29 DIAGNOSIS — F32.A DEPRESSION, UNSPECIFIED DEPRESSION TYPE: ICD-10-CM

## 2024-10-29 PROCEDURE — 99214 OFFICE O/P EST MOD 30 MIN: CPT | Performed by: STUDENT IN AN ORGANIZED HEALTH CARE EDUCATION/TRAINING PROGRAM

## 2024-10-29 RX ORDER — TRAZODONE HYDROCHLORIDE 50 MG/1
50-100 TABLET, FILM COATED ORAL NIGHTLY
Qty: 90 TABLET | Refills: 0 | Status: SHIPPED | OUTPATIENT
Start: 2024-10-29

## 2024-10-29 NOTE — PATIENT INSTRUCTIONS
Contrave: Take 1 pill in the morning for 1 week, increase to 1 pill in morning and 1 in the evening for 1 week, increase to 2 pills in morning and 1 in evening for 1 week, increase to 2 pills twice daily for maintenance.

## 2024-11-05 PROBLEM — F51.01 PRIMARY INSOMNIA: Status: ACTIVE | Noted: 2024-11-05

## 2024-11-05 PROBLEM — R60.9 SWELLING: Status: ACTIVE | Noted: 2024-11-05

## 2024-11-05 PROBLEM — F32.A DEPRESSION: Status: ACTIVE | Noted: 2024-11-05

## 2024-11-05 PROBLEM — R74.8 ELEVATED ALKALINE PHOSPHATASE LEVEL: Status: ACTIVE | Noted: 2024-11-05

## 2024-11-05 NOTE — ASSESSMENT & PLAN NOTE
Elevated alkaline phosphatase - AST/ALT normal  Hx of osteoporosis.  Check GGT.   Treat osteoporosis as below.

## 2024-11-05 NOTE — ASSESSMENT & PLAN NOTE
Improved with pristiq  Continues to have some insomnia - not worse since starting pristiq.  Continue pristiq. Add trazodone.

## 2024-11-18 ENCOUNTER — TELEPHONE (OUTPATIENT)
Dept: FAMILY MEDICINE CLINIC | Facility: CLINIC | Age: 64
End: 2024-11-18
Payer: COMMERCIAL

## 2024-11-18 RX ORDER — CIPROFLOXACIN 500 MG/1
500 TABLET, FILM COATED ORAL 2 TIMES DAILY
Qty: 10 TABLET | Refills: 0 | Status: SHIPPED | OUTPATIENT
Start: 2024-11-18

## 2024-11-18 NOTE — TELEPHONE ENCOUNTER
Caller: Azalia Castle    Relationship: Self    Best call back number: [phone]     Which medication are you concerned about: NITROFURANTOIN MONO    Who prescribed you this medication: [facility]    When did you start taking this medication: SATURDAY    What are your concerns:   PATIENT WENT INTO CLINIC FOR UTI AND WAS GIVEN THE MEDICINE. SHE IS NOW HAVING DIARRHEA,HEADACHE,NAUSEA AND CHILLS. SHE WOULD LIKE SOMETHING ELSE FOR HER UTI.  PLEASE CALL WITH ANY QUESTIONS    [facility] [id number] - [place], [place] - [address]  AT [address] [phone] [facility] [phone] FX

## 2024-12-17 DIAGNOSIS — F51.01 PRIMARY INSOMNIA: ICD-10-CM

## 2024-12-17 DIAGNOSIS — F32.A DEPRESSION, UNSPECIFIED DEPRESSION TYPE: ICD-10-CM

## 2024-12-17 RX ORDER — TRAZODONE HYDROCHLORIDE 50 MG/1
50-100 TABLET, FILM COATED ORAL NIGHTLY
Qty: 90 TABLET | Refills: 0 | Status: SHIPPED | OUTPATIENT
Start: 2024-12-17

## 2025-01-02 ENCOUNTER — TELEPHONE (OUTPATIENT)
Dept: FAMILY MEDICINE CLINIC | Facility: CLINIC | Age: 65
End: 2025-01-02
Payer: COMMERCIAL

## 2025-01-02 DIAGNOSIS — R73.9 ELEVATED RANDOM BLOOD GLUCOSE LEVEL: ICD-10-CM

## 2025-01-02 DIAGNOSIS — R74.8 ELEVATED ALKALINE PHOSPHATASE LEVEL: Primary | ICD-10-CM

## 2025-01-02 DIAGNOSIS — E78.00 PURE HYPERCHOLESTEROLEMIA: ICD-10-CM

## 2025-01-02 DIAGNOSIS — Z13.29 SCREENING FOR THYROID DISORDER: ICD-10-CM

## 2025-01-02 DIAGNOSIS — E55.9 VITAMIN D DEFICIENCY: ICD-10-CM

## 2025-01-02 DIAGNOSIS — Z13.0 SCREENING FOR DEFICIENCY ANEMIA: ICD-10-CM

## 2025-01-02 DIAGNOSIS — M81.0 OSTEOPOROSIS WITHOUT CURRENT PATHOLOGICAL FRACTURE, UNSPECIFIED OSTEOPOROSIS TYPE: ICD-10-CM

## 2025-01-02 DIAGNOSIS — Z00.00 ANNUAL PHYSICAL EXAM: ICD-10-CM

## 2025-01-02 NOTE — TELEPHONE ENCOUNTER
Caller: Azalia Castle    Relationship to patient: Self    Best call back number:     [phone]       Chief complaint: LIVER EMZYMES HIGH    Type of visit: PHYSICAL    Requested date: SOMETIME IN JANUARY    If rescheduling, when is the original appointment:     Additional notes: PATIENT WAS TOLD TO RETURN FOR PHYSICAL IN JANUARY. SHE ALSO NEEDS LABS DONE AS SOON AS POSSIBLE.

## 2025-01-03 ENCOUNTER — TELEPHONE (OUTPATIENT)
Dept: FAMILY MEDICINE CLINIC | Facility: CLINIC | Age: 65
End: 2025-01-03

## 2025-01-03 NOTE — TELEPHONE ENCOUNTER
Caller: Azalia Castle    Relationship: Self    Best call back number: [phone]*    What orders are you requesting (i.e. lab or imaging): CELIAC DISEASE TESTING    In what timeframe would the patient need to come in: PATIENT SCHEDULED TO HAVE LABS DRAWN ON 1/28/25    Where will you receive your lab/imaging services: IN OFFICE    Additional notes: PATIENT STATES THAT HER GASTROENTEROLOGIST RECOMMENDED THAT SHE HAVE LABS TO TEST FOR CELIAC DISEASE. PATIENT REQUEST ORDERS. PATIENT REQUEST A CALL BACK TO ADVISE IF THIS WILL BE ORDERED. PATIENT ALSO STATES THAT SHE WILL BE EATING A SLICE OF BREAD EVERY DAY FOR TWO WEEKS PRIOR TO LABS.

## 2025-01-08 DIAGNOSIS — F32.A DEPRESSION, UNSPECIFIED DEPRESSION TYPE: ICD-10-CM

## 2025-01-08 DIAGNOSIS — F51.01 PRIMARY INSOMNIA: ICD-10-CM

## 2025-01-08 RX ORDER — TRAZODONE HYDROCHLORIDE 50 MG/1
50-100 TABLET, FILM COATED ORAL NIGHTLY
Qty: 90 TABLET | Refills: 0 | Status: SHIPPED | OUTPATIENT
Start: 2025-01-08

## 2025-01-08 NOTE — TELEPHONE ENCOUNTER
Caller: Azalia Castle    Relationship: Self    Best call back number: [phone]    Requested Prescriptions:   Requested Prescriptions     Pending Prescriptions Disp Refills    traZODone (DESYREL) 50 MG tablet 90 tablet 0     Sig: Take 1-2 tablets by mouth Every Night.        Pharmacy where request should be sent: [id number] - [place], located [facility] [place]  [address]  AT [place] [address] [address] - [phone]  - [phone] FX     Last office visit with prescribing clinician: 10/29/2024   Last telemedicine visit with prescribing clinician: Visit date not found   Next office visit with prescribing clinician: 2/3/2025     Additional details provided by patient: STATED THAT THEY DID NOT RECEIVE THE REFILL FROM DECEMBER BUT THEY WOULD LIKE TO GET THE MEDICATION TO COVER THE MONTH WITH THEM TAKING 2 A DAY AS THAT'S THE WAY IT HAS BEEN WORKING FOR THEM. STATED THAT THEY TOOK THE LAST OF THE MEDICATION YESTERDAY SO THEY WOULD LIKE TO SEE ABOUT GETTING THIS TODAY IF POSSIBLE    Does the patient have less than a 3 day supply:  [x] Yes  [] No    Would you like a call back once the refill request has been completed: [] Yes [x] No    If the office needs to give you a call back, can they leave a voicemail: [] Yes [x] No    Lianet Christiansen Rep   01/08/25 14:42 EST

## 2025-01-14 ENCOUNTER — TELEPHONE (OUTPATIENT)
Dept: FAMILY MEDICINE CLINIC | Facility: CLINIC | Age: 65
End: 2025-01-14
Payer: COMMERCIAL

## 2025-01-14 NOTE — TELEPHONE ENCOUNTER
Caller: Azalia Castle    Relationship to patient: Self    Best call back number:      Patient is needing: PATIENT IS WANTING A CALLBACK TO LET HER KNOW IF A LAB HAS BEEN ORDERED FOR HER LAB VISIT ON 1/28 TO TEST FOR CELIAC DISEASE.   SHE STATES SHE IS TO PREP FOR THAT 2 WEEKS PRIOR AND DOES NOT WANT TO START THAT IF DR. NICHOLE DID NOT ORDER THE CELIAC LAB.  PLEASE ADVISE.

## 2025-01-28 LAB — 25(OH)D3+25(OH)D2 SERPL-MCNC: 61.7 NG/ML (ref 30–100)

## 2025-02-03 ENCOUNTER — OFFICE VISIT (OUTPATIENT)
Dept: FAMILY MEDICINE CLINIC | Facility: CLINIC | Age: 65
End: 2025-02-03
Payer: COMMERCIAL

## 2025-02-03 VITALS
WEIGHT: 144 LBS | BODY MASS INDEX: 28.27 KG/M2 | HEART RATE: 78 BPM | SYSTOLIC BLOOD PRESSURE: 118 MMHG | DIASTOLIC BLOOD PRESSURE: 72 MMHG | HEIGHT: 60 IN | TEMPERATURE: 97.1 F | OXYGEN SATURATION: 98 %

## 2025-02-03 DIAGNOSIS — F32.A DEPRESSION, UNSPECIFIED DEPRESSION TYPE: ICD-10-CM

## 2025-02-03 DIAGNOSIS — Z00.00 ENCOUNTER FOR HEALTH MAINTENANCE EXAMINATION IN ADULT: Primary | ICD-10-CM

## 2025-02-03 DIAGNOSIS — J30.9 ALLERGIC SINUSITIS: ICD-10-CM

## 2025-02-03 DIAGNOSIS — F51.01 PRIMARY INSOMNIA: ICD-10-CM

## 2025-02-03 DIAGNOSIS — R05.9 COUGH, UNSPECIFIED TYPE: ICD-10-CM

## 2025-02-03 PROCEDURE — 99396 PREV VISIT EST AGE 40-64: CPT | Performed by: STUDENT IN AN ORGANIZED HEALTH CARE EDUCATION/TRAINING PROGRAM

## 2025-02-03 RX ORDER — DEXTROMETHORPHAN HYDROBROMIDE AND PROMETHAZINE HYDROCHLORIDE 15; 6.25 MG/5ML; MG/5ML
5 SYRUP ORAL 4 TIMES DAILY PRN
Qty: 240 ML | Refills: 1 | Status: SHIPPED | OUTPATIENT
Start: 2025-02-03

## 2025-02-03 RX ORDER — METHYLPREDNISOLONE 4 MG/1
TABLET ORAL
Qty: 21 EACH | Refills: 0 | Status: SHIPPED | OUTPATIENT
Start: 2025-02-03

## 2025-02-03 RX ORDER — TRAZODONE HYDROCHLORIDE 50 MG/1
50-100 TABLET, FILM COATED ORAL NIGHTLY
Qty: 180 TABLET | Refills: 3 | Status: SHIPPED | OUTPATIENT
Start: 2025-02-03

## 2025-02-03 NOTE — ASSESSMENT & PLAN NOTE
Colonoscopy: scheduled with Dr. London  Cervical Cancer Screening: UTD with GYN  Mammogram: UTD with GYN  LDCT: never smoker  DEXA: osteoporosis - on Evista. Managed by GYN. Recent DEXA with improvement in tscores.    Immunizations: eligible for Shingrex  Labs: reviewed today  The 10-year ASCVD risk score (Herberth GAN, et al., 2019) is: 4.6%    Values used to calculate the score:      Age: 64 years      Sex: Female      Is Non- : No      Diabetic: No      Tobacco smoker: No      Systolic Blood Pressure: 118 mmHg      Is BP treated: No      HDL Cholesterol: 51 mg/dL      Total Cholesterol: 209 mg/dL      Patient was counseled in regards to maintaining a healthy lifestyle, rich in whole grains, fruits and vegetables. Limit high saturated fats and processed sugars. Maintain an active lifestyle to promote overall health and well being.

## 2025-02-03 NOTE — PROGRESS NOTES
"Chief Complaint  Annual Exam, Headache (Pt has concerns for a sinus infection. Symptoms onset last night.), and Cough (Pt still has a cough from having the flu a couple weeks ago and would like to recheck cough.)    Subjective        Azalia Castle presents to [facility]  History of Present Illness  64-year-old female with anxiety and depression, osteoporosis, hyperlipidemia who presents for annual exam today.    Depression is controlled on Pristiq. Continues to have some insomnia -much improved with trazodone.  She is on a Evista for osteoporosis.  Managed by GYN.  Does have elevated alkaline phosphatase with normal GGT.    Hand and foot swelling -on HCTZ    Hyperlipidemia is managed with diet.      Objective   Vital Signs:  /72   Pulse 78   Temp 97.1 °F (36.2 °C)   Ht 152.4 cm (60\")   Wt 65.3 kg (144 lb)   SpO2 98%   BMI 28.12 kg/m²   Estimated body mass index is 28.12 kg/m² as calculated from the following:    Height as of this encounter: 152.4 cm (60\").    Weight as of this encounter: 65.3 kg (144 lb).            Physical Exam  Constitutional:       General: She is not in acute distress.  HENT:      Right Ear: Tympanic membrane normal.      Left Ear: Tympanic membrane normal.      Mouth/Throat:      Pharynx: Posterior oropharyngeal erythema present. No oropharyngeal exudate.   Eyes:      Conjunctiva/sclera: Conjunctivae normal.   Cardiovascular:      Rate and Rhythm: Normal rate and regular rhythm.   Pulmonary:      Effort: Pulmonary effort is normal. No respiratory distress.   Abdominal:      Palpations: Abdomen is soft.      Tenderness: There is no abdominal tenderness.   Neurological:      Mental Status: She is alert and oriented to person, place, and time.   Psychiatric:         Mood and Affect: Mood normal.         Behavior: Behavior normal.        Result Review :  The following data was reviewed by: Shirley Leal MD on 02/03/2025:  Common labs          " 6/26/2024    14:48 1/28/2025    09:45   Common Labs   Glucose 91  102    BUN 21  16    Creatinine 0.74  0.84    Sodium 139  141    Potassium 4.7  4.5    Chloride 104  104    Calcium 9.9  9.6    Total Protein 6.8  6.7    Albumin 4.7  4.1    Total Bilirubin 0.2  0.3    Alkaline Phosphatase 147  125    AST (SGOT) 16  16    ALT (SGPT) 16  20    WBC  4.84    Hemoglobin  13.6    Hematocrit  40.7    Platelets  228    Total Cholesterol  209    Triglycerides  80    HDL Cholesterol  51    LDL Cholesterol   144    Hemoglobin A1C  5.50      Data reviewed : None           Assessment and Plan   Diagnoses and all orders for this visit:    1. Encounter for health maintenance examination in adult (Primary)  Assessment & Plan:  Colonoscopy: scheduled with Dr. London  Cervical Cancer Screening: UTD with GYN  Mammogram: UTD with GYN  LDCT: never smoker  DEXA: osteoporosis - on Evista. Managed by GYN. Recent DEXA with improvement in tscores.    Immunizations: eligible for Shingrex  Labs: reviewed today  The 10-year ASCVD risk score (Herberth DK, et al., 2019) is: 4.6%    Values used to calculate the score:      Age: 64 years      Sex: Female      Is Non- : No      Diabetic: No      Tobacco smoker: No      Systolic Blood Pressure: 118 mmHg      Is BP treated: No      HDL Cholesterol: 51 mg/dL      Total Cholesterol: 209 mg/dL      Patient was counseled in regards to maintaining a healthy lifestyle, rich in whole grains, fruits and vegetables. Limit high saturated fats and processed sugars. Maintain an active lifestyle to promote overall health and well being.         2. Allergic sinusitis  -     methylPREDNISolone (MEDROL) 4 MG dose pack; Take as directed on package instructions.  Dispense: 21 each; Refill: 0    3. Primary insomnia  -     traZODone (DESYREL) 50 MG tablet; Take 1-2 tablets by mouth Every Night.  Dispense: 180 tablet; Refill: 3    4. Depression, unspecified depression type  -     traZODone (DESYREL)  50 MG tablet; Take 1-2 tablets by mouth Every Night.  Dispense: 180 tablet; Refill: 3    Other orders  -     promethazine-dextromethorphan (PROMETHAZINE-DM) 6.25-15 MG/5ML syrup; Take 5 mL by mouth 4 (Four) Times a Day As Needed for Cough.  Dispense: 240 mL; Refill: 1             Follow Up   No follow-ups on file.  Patient was given instructions and counseling regarding her condition or for health maintenance advice. Please see specific information pulled into the AVS if appropriate.

## 2025-04-07 DIAGNOSIS — R60.0 BILATERAL LOWER EXTREMITY EDEMA: ICD-10-CM

## 2025-04-07 RX ORDER — HYDROCHLOROTHIAZIDE 12.5 MG/1
12.5 TABLET ORAL DAILY
Qty: 90 TABLET | Refills: 3 | Status: SHIPPED | OUTPATIENT
Start: 2025-04-07

## 2025-08-05 ENCOUNTER — OFFICE VISIT (OUTPATIENT)
Dept: FAMILY MEDICINE CLINIC | Facility: CLINIC | Age: 65
End: 2025-08-05
Payer: COMMERCIAL

## 2025-08-05 VITALS
SYSTOLIC BLOOD PRESSURE: 148 MMHG | BODY MASS INDEX: 27.88 KG/M2 | DIASTOLIC BLOOD PRESSURE: 80 MMHG | OXYGEN SATURATION: 98 % | HEART RATE: 78 BPM | HEIGHT: 60 IN | TEMPERATURE: 97.8 F | WEIGHT: 142 LBS

## 2025-08-05 DIAGNOSIS — R03.0 ELEVATED BLOOD PRESSURE READING IN OFFICE WITHOUT DIAGNOSIS OF HYPERTENSION: ICD-10-CM

## 2025-08-05 DIAGNOSIS — F41.1 GAD (GENERALIZED ANXIETY DISORDER): Primary | ICD-10-CM

## 2025-08-05 DIAGNOSIS — F33.40 RECURRENT MAJOR DEPRESSIVE DISORDER, IN REMISSION: ICD-10-CM

## 2025-08-05 PROCEDURE — 99214 OFFICE O/P EST MOD 30 MIN: CPT | Performed by: STUDENT IN AN ORGANIZED HEALTH CARE EDUCATION/TRAINING PROGRAM

## 2025-08-05 RX ORDER — DESVENLAFAXINE 100 MG/1
100 TABLET, EXTENDED RELEASE ORAL DAILY
Qty: 90 TABLET | Refills: 3 | Status: SHIPPED | OUTPATIENT
Start: 2025-08-05

## 2025-08-05 RX ORDER — BUSPIRONE HYDROCHLORIDE 5 MG/1
5 TABLET ORAL 3 TIMES DAILY
Qty: 90 TABLET | Refills: 5 | Status: SHIPPED | OUTPATIENT
Start: 2025-08-05

## 2025-08-29 ENCOUNTER — TELEPHONE (OUTPATIENT)
Dept: FAMILY MEDICINE CLINIC | Facility: CLINIC | Age: 65
End: 2025-08-29
Payer: COMMERCIAL